# Patient Record
Sex: MALE | URBAN - METROPOLITAN AREA
[De-identification: names, ages, dates, MRNs, and addresses within clinical notes are randomized per-mention and may not be internally consistent; named-entity substitution may affect disease eponyms.]

---

## 2019-07-22 ENCOUNTER — APPOINTMENT (RX ONLY)
Dept: URBAN - METROPOLITAN AREA CLINIC 143 | Facility: CLINIC | Age: 74
Setting detail: DERMATOLOGY
End: 2019-07-22

## 2019-07-22 DIAGNOSIS — B35.1 TINEA UNGUIUM: ICD-10-CM

## 2019-07-22 DIAGNOSIS — L81.4 OTHER MELANIN HYPERPIGMENTATION: ICD-10-CM

## 2019-07-22 DIAGNOSIS — L82.1 OTHER SEBORRHEIC KERATOSIS: ICD-10-CM

## 2019-07-22 DIAGNOSIS — D18.0 HEMANGIOMA: ICD-10-CM

## 2019-07-22 DIAGNOSIS — L72.8 OTHER FOLLICULAR CYSTS OF THE SKIN AND SUBCUTANEOUS TISSUE: ICD-10-CM

## 2019-07-22 DIAGNOSIS — D22 MELANOCYTIC NEVI: ICD-10-CM

## 2019-07-22 DIAGNOSIS — N48.1 BALANITIS: ICD-10-CM

## 2019-07-22 PROBLEM — D18.01 HEMANGIOMA OF SKIN AND SUBCUTANEOUS TISSUE: Status: ACTIVE | Noted: 2019-07-22

## 2019-07-22 PROBLEM — D22.5 MELANOCYTIC NEVI OF TRUNK: Status: ACTIVE | Noted: 2019-07-22

## 2019-07-22 PROBLEM — E13.9 OTHER SPECIFIED DIABETES MELLITUS WITHOUT COMPLICATIONS: Status: ACTIVE | Noted: 2019-07-22

## 2019-07-22 PROCEDURE — ? NAIL CLIPPING FOR PAS

## 2019-07-22 PROCEDURE — ? COUNSELING

## 2019-07-22 PROCEDURE — ? PRESCRIPTION

## 2019-07-22 PROCEDURE — 99203 OFFICE O/P NEW LOW 30 MIN: CPT

## 2019-07-22 RX ORDER — FLUOCINONIDE 0.5 MG/G
CREAM TOPICAL
Qty: 1 | Refills: 2 | Status: ERX | COMMUNITY
Start: 2019-07-22

## 2019-07-22 RX ADMIN — FLUOCINONIDE: 0.5 CREAM TOPICAL at 23:16

## 2019-07-22 ASSESSMENT — LOCATION SIMPLE DESCRIPTION DERM
LOCATION SIMPLE: LEFT GREAT TOE
LOCATION SIMPLE: ABDOMEN
LOCATION SIMPLE: UPPER BACK
LOCATION SIMPLE: RIGHT GREAT TOE
LOCATION SIMPLE: LOWER BACK

## 2019-07-22 ASSESSMENT — LOCATION ZONE DERM
LOCATION ZONE: TRUNK
LOCATION ZONE: TOENAIL
LOCATION ZONE: TOE

## 2019-07-22 ASSESSMENT — LOCATION DETAILED DESCRIPTION DERM
LOCATION DETAILED: EPIGASTRIC SKIN
LOCATION DETAILED: LEFT DISTAL PLANTAR GREAT TOE
LOCATION DETAILED: INFERIOR THORACIC SPINE
LOCATION DETAILED: SUPERIOR LUMBAR SPINE
LOCATION DETAILED: RIGHT GREAT TOENAIL

## 2019-07-22 NOTE — PROCEDURE: MIPS QUALITY
Detail Level: Detailed
Quality 155 (Denominator): Falls Plan Of Care: Plan of Care not Documented, Reason not Otherwise Specified
Quality 131: Pain Assessment And Follow-Up: Pain assessment using a standardized tool is documented as negative, no follow-up plan required
Quality 154 Part B: Falls: Risk Screening (Should Be Reported With Measure 155.): Patient screened for future fall risk; documentation of no falls in the past year or only one fall without injury in the past year
Quality 154 Part A: Falls: Risk Assessment (Should Be Reported With Measure 155.): Falls risk assessment completed and documented in the past 12 months.
Quality 47: Advance Care Plan: Advance Care Planning discussed and documented in the medical record; patient did not wish or was not able to name a surrogate decision maker or provide an advance care plan.
Quality 110: Preventive Care And Screening: Influenza Immunization: Influenza Immunization not Administered for Documented Reasons.
Quality 111:Pneumonia Vaccination Status For Older Adults: Pneumococcal Vaccination not Administered or Previously Received, Reason not Otherwise Specified
Quality 226: Preventive Care And Screening: Tobacco Use: Screening And Cessation Intervention: Patient screened for tobacco use and is an ex/non-smoker

## 2019-08-16 ENCOUNTER — APPOINTMENT (RX ONLY)
Dept: URBAN - METROPOLITAN AREA CLINIC 142 | Facility: CLINIC | Age: 74
Setting detail: DERMATOLOGY
End: 2019-08-16

## 2019-08-16 DIAGNOSIS — L08.9 LOCAL INFECTION OF THE SKIN AND SUBCUTANEOUS TISSUE, UNSPECIFIED: ICD-10-CM

## 2019-08-16 DIAGNOSIS — L60.3 NAIL DYSTROPHY: ICD-10-CM

## 2019-08-16 DIAGNOSIS — N48.1 BALANITIS: ICD-10-CM

## 2019-08-16 PROCEDURE — 99213 OFFICE O/P EST LOW 20 MIN: CPT

## 2019-08-16 PROCEDURE — ? PRESCRIPTION

## 2019-08-16 PROCEDURE — ? COUNSELING

## 2019-08-16 PROCEDURE — ? ORDER TESTS

## 2019-08-16 RX ORDER — CIPROFLOXACIN HYDROCHLORIDE 500 MG/1
TABLET, FILM COATED ORAL TWICE A DAY
Qty: 20 | Refills: 0 | Status: ERX | COMMUNITY
Start: 2019-08-16

## 2019-08-16 RX ORDER — POLY-UREAURETHANE 16 %
NAIL FILM SOLUTION (ML) TOPICAL
Qty: 1 | Refills: 10 | Status: ERX | COMMUNITY
Start: 2019-08-16

## 2019-08-16 RX ORDER — DOXYCYCLINE HYCLATE 100 MG/1
CAPSULE, GELATIN COATED ORAL
Qty: 20 | Refills: 0 | Status: ERX | COMMUNITY
Start: 2019-08-16

## 2019-08-16 RX ADMIN — Medication: at 21:52

## 2019-08-16 RX ADMIN — CIPROFLOXACIN HYDROCHLORIDE: 500 TABLET, FILM COATED ORAL at 21:43

## 2019-08-16 RX ADMIN — DOXYCYCLINE HYCLATE: 100 CAPSULE, GELATIN COATED ORAL at 21:44

## 2019-08-16 ASSESSMENT — LOCATION ZONE DERM
LOCATION ZONE: PENIS
LOCATION ZONE: TOE
LOCATION ZONE: TOENAIL

## 2019-08-16 ASSESSMENT — LOCATION SIMPLE DESCRIPTION DERM
LOCATION SIMPLE: LEFT GREAT TOE
LOCATION SIMPLE: PENIS
LOCATION SIMPLE: RIGHT GREAT TOE

## 2019-08-16 ASSESSMENT — LOCATION DETAILED DESCRIPTION DERM
LOCATION DETAILED: LEFT DISTAL PLANTAR GREAT TOE
LOCATION DETAILED: RIGHT GREAT TOENAIL
LOCATION DETAILED: DORSAL GLANS

## 2019-08-16 NOTE — PROCEDURE: MIPS QUALITY
Quality 110: Preventive Care And Screening: Influenza Immunization: Influenza Immunization not Administered for Documented Reasons.
Detail Level: Detailed
Quality 226: Preventive Care And Screening: Tobacco Use: Screening And Cessation Intervention: Patient screened for tobacco use and is an ex/non-smoker
Quality 131: Pain Assessment And Follow-Up: Pain assessment using a standardized tool is documented as negative, no follow-up plan required
Quality 155 (Denominator): Falls Plan Of Care: Plan of Care not Documented, Reason not Otherwise Specified
Quality 47: Advance Care Plan: Advance Care Planning discussed and documented in the medical record; patient did not wish or was not able to name a surrogate decision maker or provide an advance care plan.
Quality 111:Pneumonia Vaccination Status For Older Adults: Pneumococcal Vaccination not Administered or Previously Received, Reason not Otherwise Specified
Quality 154 Part B: Falls: Risk Screening (Should Be Reported With Measure 155.): Patient screened for future fall risk; documentation of no falls in the past year or only one fall without injury in the past year
Quality 154 Part A: Falls: Risk Assessment (Should Be Reported With Measure 155.): Falls risk assessment completed and documented in the past 12 months.

## 2023-03-14 ENCOUNTER — APPOINTMENT (OUTPATIENT)
Dept: CT IMAGING | Age: 78
DRG: 056 | End: 2023-03-14
Payer: MEDICARE

## 2023-03-14 ENCOUNTER — HOSPITAL ENCOUNTER (INPATIENT)
Age: 78
LOS: 1 days | Discharge: HOME | DRG: 056 | End: 2023-03-16
Attending: EMERGENCY MEDICINE | Admitting: INTERNAL MEDICINE
Payer: MEDICARE

## 2023-03-14 DIAGNOSIS — R68.89 POSTURAL INSTABILITY OF TRUNK: ICD-10-CM

## 2023-03-14 DIAGNOSIS — R41.82 ALTERED MENTAL STATUS, UNSPECIFIED ALTERED MENTAL STATUS TYPE: ICD-10-CM

## 2023-03-14 DIAGNOSIS — R42 DIZZINESS: ICD-10-CM

## 2023-03-14 DIAGNOSIS — R29.90 STROKE-LIKE SYMPTOMS: Primary | ICD-10-CM

## 2023-03-14 LAB
ALBUMIN SERPL-MCNC: 4 G/DL (ref 3.4–5)
ALBUMIN/GLOB SERPL: 1.2 {RATIO} (ref 1.1–2.2)
ALP SERPL-CCNC: 72 U/L (ref 40–129)
ALT SERPL-CCNC: 16 U/L (ref 10–40)
ANION GAP SERPL CALCULATED.3IONS-SCNC: 11 MMOL/L (ref 3–16)
AST SERPL-CCNC: 28 U/L (ref 15–37)
BASOPHILS # BLD: 0 K/UL (ref 0–0.2)
BASOPHILS NFR BLD: 0.4 %
BILIRUB SERPL-MCNC: 0.5 MG/DL (ref 0–1)
BUN SERPL-MCNC: 14 MG/DL (ref 7–20)
CALCIUM SERPL-MCNC: 9.3 MG/DL (ref 8.3–10.6)
CHLORIDE SERPL-SCNC: 99 MMOL/L (ref 99–110)
CO2 SERPL-SCNC: 26 MMOL/L (ref 21–32)
CREAT SERPL-MCNC: 0.8 MG/DL (ref 0.8–1.3)
DEPRECATED RDW RBC AUTO: 14.6 % (ref 12.4–15.4)
EOSINOPHIL # BLD: 0 K/UL (ref 0–0.6)
EOSINOPHIL NFR BLD: 0.3 %
GFR SERPLBLD CREATININE-BSD FMLA CKD-EPI: >60 ML/MIN/{1.73_M2}
GLUCOSE SERPL-MCNC: 237 MG/DL (ref 70–99)
HCT VFR BLD AUTO: 41.2 % (ref 40.5–52.5)
HGB BLD-MCNC: 13.4 G/DL (ref 13.5–17.5)
INR PPP: 0.93 (ref 0.87–1.14)
LYMPHOCYTES # BLD: 0.5 K/UL (ref 1–5.1)
LYMPHOCYTES NFR BLD: 5.7 %
MCH RBC QN AUTO: 29.9 PG (ref 26–34)
MCHC RBC AUTO-ENTMCNC: 32.4 G/DL (ref 31–36)
MCV RBC AUTO: 92.2 FL (ref 80–100)
MONOCYTES # BLD: 0.7 K/UL (ref 0–1.3)
MONOCYTES NFR BLD: 8.9 %
NEUTROPHILS # BLD: 6.9 K/UL (ref 1.7–7.7)
NEUTROPHILS NFR BLD: 84.7 %
PLATELET # BLD AUTO: 227 K/UL (ref 135–450)
PMV BLD AUTO: 8.5 FL (ref 5–10.5)
POTASSIUM SERPL-SCNC: 4.7 MMOL/L (ref 3.5–5.1)
PROT SERPL-MCNC: 7.3 G/DL (ref 6.4–8.2)
PROTHROMBIN TIME: 12.3 SEC (ref 11.7–14.5)
RBC # BLD AUTO: 4.47 M/UL (ref 4.2–5.9)
SODIUM SERPL-SCNC: 136 MMOL/L (ref 136–145)
TROPONIN T SERPL-MCNC: <0.01 NG/ML
WBC # BLD AUTO: 8.1 K/UL (ref 4–11)

## 2023-03-14 PROCEDURE — 70450 CT HEAD/BRAIN W/O DYE: CPT

## 2023-03-14 PROCEDURE — 70498 CT ANGIOGRAPHY NECK: CPT

## 2023-03-14 PROCEDURE — 6360000004 HC RX CONTRAST MEDICATION: Performed by: PHYSICIAN ASSISTANT

## 2023-03-14 PROCEDURE — 99285 EMERGENCY DEPT VISIT HI MDM: CPT

## 2023-03-14 PROCEDURE — 85025 COMPLETE CBC W/AUTO DIFF WBC: CPT

## 2023-03-14 PROCEDURE — 84484 ASSAY OF TROPONIN QUANT: CPT

## 2023-03-14 PROCEDURE — 93005 ELECTROCARDIOGRAM TRACING: CPT | Performed by: PHYSICIAN ASSISTANT

## 2023-03-14 PROCEDURE — 80053 COMPREHEN METABOLIC PANEL: CPT

## 2023-03-14 PROCEDURE — 85610 PROTHROMBIN TIME: CPT

## 2023-03-14 RX ORDER — ASCORBIC ACID 500 MG
500 TABLET ORAL DAILY
COMMUNITY

## 2023-03-14 RX ORDER — DAPAGLIFLOZIN AND METFORMIN HYDROCHLORIDE 5; 1000 MG/1; MG/1
1 TABLET, FILM COATED, EXTENDED RELEASE ORAL DAILY
COMMUNITY

## 2023-03-14 RX ORDER — LOSARTAN POTASSIUM 50 MG/1
50 TABLET ORAL DAILY
COMMUNITY

## 2023-03-14 RX ORDER — AZITHROMYCIN 250 MG/1
TABLET, FILM COATED ORAL DAILY
Status: ON HOLD | COMMUNITY
End: 2023-03-16 | Stop reason: HOSPADM

## 2023-03-14 RX ADMIN — IOPAMIDOL 75 ML: 755 INJECTION, SOLUTION INTRAVENOUS at 22:08

## 2023-03-14 ASSESSMENT — PAIN - FUNCTIONAL ASSESSMENT: PAIN_FUNCTIONAL_ASSESSMENT: NONE - DENIES PAIN

## 2023-03-15 ENCOUNTER — APPOINTMENT (OUTPATIENT)
Dept: MRI IMAGING | Age: 78
DRG: 056 | End: 2023-03-15
Payer: MEDICARE

## 2023-03-15 PROBLEM — I63.9 ACUTE CVA (CEREBROVASCULAR ACCIDENT) (HCC): Status: RESOLVED | Noted: 2023-03-15 | Resolved: 2023-03-15

## 2023-03-15 PROBLEM — I63.9 ACUTE CVA (CEREBROVASCULAR ACCIDENT) (HCC): Status: ACTIVE | Noted: 2023-03-15

## 2023-03-15 PROBLEM — R27.0 ATAXIA: Status: ACTIVE | Noted: 2023-03-15

## 2023-03-15 LAB
BASOPHILS # BLD: 0 K/UL (ref 0–0.2)
BASOPHILS NFR BLD: 0.4 %
CHOLEST SERPL-MCNC: 111 MG/DL (ref 0–199)
DEPRECATED RDW RBC AUTO: 14.6 % (ref 12.4–15.4)
EKG ATRIAL RATE: 90 BPM
EKG DIAGNOSIS: NORMAL
EKG P AXIS: 64 DEGREES
EKG P-R INTERVAL: 172 MS
EKG Q-T INTERVAL: 350 MS
EKG QRS DURATION: 102 MS
EKG QTC CALCULATION (BAZETT): 428 MS
EKG R AXIS: -50 DEGREES
EKG T AXIS: 11 DEGREES
EKG VENTRICULAR RATE: 90 BPM
EOSINOPHIL # BLD: 0 K/UL (ref 0–0.6)
EOSINOPHIL NFR BLD: 0 %
EST. AVERAGE GLUCOSE BLD GHB EST-MCNC: 234.6 MG/DL
GLUCOSE BLD-MCNC: 147 MG/DL (ref 70–99)
GLUCOSE BLD-MCNC: 166 MG/DL (ref 70–99)
GLUCOSE BLD-MCNC: 194 MG/DL (ref 70–99)
GLUCOSE BLD-MCNC: 216 MG/DL (ref 70–99)
GLUCOSE BLD-MCNC: 260 MG/DL (ref 70–99)
HBA1C MFR BLD: 9.8 %
HCT VFR BLD AUTO: 37.8 % (ref 40.5–52.5)
HDLC SERPL-MCNC: 50 MG/DL (ref 40–60)
HGB BLD-MCNC: 12.3 G/DL (ref 13.5–17.5)
LDLC SERPL CALC-MCNC: 50 MG/DL
LV EF: 63 %
LVEF MODALITY: NORMAL
LYMPHOCYTES # BLD: 0.7 K/UL (ref 1–5.1)
LYMPHOCYTES NFR BLD: 10.2 %
MCH RBC QN AUTO: 29.9 PG (ref 26–34)
MCHC RBC AUTO-ENTMCNC: 32.7 G/DL (ref 31–36)
MCV RBC AUTO: 91.5 FL (ref 80–100)
MONOCYTES # BLD: 0.7 K/UL (ref 0–1.3)
MONOCYTES NFR BLD: 10.4 %
NEUTROPHILS # BLD: 5.1 K/UL (ref 1.7–7.7)
NEUTROPHILS NFR BLD: 79 %
PERFORMED ON: ABNORMAL
PLATELET # BLD AUTO: 196 K/UL (ref 135–450)
PMV BLD AUTO: 8.8 FL (ref 5–10.5)
RBC # BLD AUTO: 4.13 M/UL (ref 4.2–5.9)
TRIGL SERPL-MCNC: 53 MG/DL (ref 0–150)
TROPONIN T SERPL-MCNC: <0.01 NG/ML
VLDLC SERPL CALC-MCNC: 11 MG/DL
WBC # BLD AUTO: 6.4 K/UL (ref 4–11)

## 2023-03-15 PROCEDURE — 6370000000 HC RX 637 (ALT 250 FOR IP): Performed by: INTERNAL MEDICINE

## 2023-03-15 PROCEDURE — 6370000000 HC RX 637 (ALT 250 FOR IP): Performed by: PHYSICIAN ASSISTANT

## 2023-03-15 PROCEDURE — 36415 COLL VENOUS BLD VENIPUNCTURE: CPT

## 2023-03-15 PROCEDURE — 97161 PT EVAL LOW COMPLEX 20 MIN: CPT | Performed by: PHYSICAL THERAPIST

## 2023-03-15 PROCEDURE — 85025 COMPLETE CBC W/AUTO DIFF WBC: CPT

## 2023-03-15 PROCEDURE — 2060000000 HC ICU INTERMEDIATE R&B

## 2023-03-15 PROCEDURE — 80061 LIPID PANEL: CPT

## 2023-03-15 PROCEDURE — 93306 TTE W/DOPPLER COMPLETE: CPT

## 2023-03-15 PROCEDURE — 1200000000 HC SEMI PRIVATE

## 2023-03-15 PROCEDURE — 84484 ASSAY OF TROPONIN QUANT: CPT

## 2023-03-15 PROCEDURE — 97165 OT EVAL LOW COMPLEX 30 MIN: CPT

## 2023-03-15 PROCEDURE — 97116 GAIT TRAINING THERAPY: CPT | Performed by: PHYSICAL THERAPIST

## 2023-03-15 PROCEDURE — 70551 MRI BRAIN STEM W/O DYE: CPT

## 2023-03-15 PROCEDURE — 97530 THERAPEUTIC ACTIVITIES: CPT | Performed by: PHYSICAL THERAPIST

## 2023-03-15 PROCEDURE — 97530 THERAPEUTIC ACTIVITIES: CPT

## 2023-03-15 PROCEDURE — 83036 HEMOGLOBIN GLYCOSYLATED A1C: CPT

## 2023-03-15 PROCEDURE — 2580000003 HC RX 258: Performed by: INTERNAL MEDICINE

## 2023-03-15 RX ORDER — SODIUM CHLORIDE 0.9 % (FLUSH) 0.9 %
10 SYRINGE (ML) INJECTION EVERY 12 HOURS SCHEDULED
Status: DISCONTINUED | OUTPATIENT
Start: 2023-03-15 | End: 2023-03-16 | Stop reason: HOSPADM

## 2023-03-15 RX ORDER — ERYTHROMYCIN 5 MG/G
OINTMENT OPHTHALMIC EVERY 8 HOURS SCHEDULED
Status: DISCONTINUED | OUTPATIENT
Start: 2023-03-15 | End: 2023-03-16 | Stop reason: HOSPADM

## 2023-03-15 RX ORDER — INSULIN LISPRO 100 [IU]/ML
0-4 INJECTION, SOLUTION INTRAVENOUS; SUBCUTANEOUS NIGHTLY
Status: DISCONTINUED | OUTPATIENT
Start: 2023-03-15 | End: 2023-03-16 | Stop reason: HOSPADM

## 2023-03-15 RX ORDER — ASCORBIC ACID 500 MG
500 TABLET ORAL DAILY
Status: DISCONTINUED | OUTPATIENT
Start: 2023-03-15 | End: 2023-03-16 | Stop reason: HOSPADM

## 2023-03-15 RX ORDER — POTASSIUM CHLORIDE 7.45 MG/ML
10 INJECTION INTRAVENOUS PRN
Status: DISCONTINUED | OUTPATIENT
Start: 2023-03-15 | End: 2023-03-16 | Stop reason: HOSPADM

## 2023-03-15 RX ORDER — LOSARTAN POTASSIUM 50 MG/1
50 TABLET ORAL DAILY
Status: DISCONTINUED | OUTPATIENT
Start: 2023-03-15 | End: 2023-03-16 | Stop reason: HOSPADM

## 2023-03-15 RX ORDER — ONDANSETRON 2 MG/ML
4 INJECTION INTRAMUSCULAR; INTRAVENOUS EVERY 6 HOURS PRN
Status: DISCONTINUED | OUTPATIENT
Start: 2023-03-15 | End: 2023-03-16 | Stop reason: HOSPADM

## 2023-03-15 RX ORDER — PROMETHAZINE HYDROCHLORIDE 25 MG/1
12.5 TABLET ORAL EVERY 6 HOURS PRN
Status: DISCONTINUED | OUTPATIENT
Start: 2023-03-15 | End: 2023-03-16 | Stop reason: HOSPADM

## 2023-03-15 RX ORDER — ASPIRIN 300 MG/1
300 SUPPOSITORY RECTAL DAILY
Status: DISCONTINUED | OUTPATIENT
Start: 2023-03-15 | End: 2023-03-16 | Stop reason: HOSPADM

## 2023-03-15 RX ORDER — MAGNESIUM SULFATE IN WATER 40 MG/ML
2000 INJECTION, SOLUTION INTRAVENOUS PRN
Status: DISCONTINUED | OUTPATIENT
Start: 2023-03-15 | End: 2023-03-16 | Stop reason: HOSPADM

## 2023-03-15 RX ORDER — POLYETHYLENE GLYCOL 3350 17 G/17G
17 POWDER, FOR SOLUTION ORAL DAILY PRN
Status: DISCONTINUED | OUTPATIENT
Start: 2023-03-15 | End: 2023-03-16 | Stop reason: HOSPADM

## 2023-03-15 RX ORDER — DEXTROSE MONOHYDRATE 100 MG/ML
INJECTION, SOLUTION INTRAVENOUS CONTINUOUS PRN
Status: DISCONTINUED | OUTPATIENT
Start: 2023-03-15 | End: 2023-03-16 | Stop reason: HOSPADM

## 2023-03-15 RX ORDER — INSULIN LISPRO 100 [IU]/ML
0-4 INJECTION, SOLUTION INTRAVENOUS; SUBCUTANEOUS
Status: DISCONTINUED | OUTPATIENT
Start: 2023-03-15 | End: 2023-03-16 | Stop reason: HOSPADM

## 2023-03-15 RX ORDER — ATORVASTATIN CALCIUM 80 MG/1
80 TABLET, FILM COATED ORAL NIGHTLY
Status: DISCONTINUED | OUTPATIENT
Start: 2023-03-15 | End: 2023-03-16 | Stop reason: HOSPADM

## 2023-03-15 RX ORDER — LABETALOL HYDROCHLORIDE 5 MG/ML
10 INJECTION, SOLUTION INTRAVENOUS EVERY 10 MIN PRN
Status: DISCONTINUED | OUTPATIENT
Start: 2023-03-15 | End: 2023-03-16 | Stop reason: HOSPADM

## 2023-03-15 RX ORDER — ASPIRIN 81 MG/1
81 TABLET ORAL DAILY
Status: DISCONTINUED | OUTPATIENT
Start: 2023-03-15 | End: 2023-03-16 | Stop reason: HOSPADM

## 2023-03-15 RX ORDER — SODIUM CHLORIDE 0.9 % (FLUSH) 0.9 %
10 SYRINGE (ML) INJECTION PRN
Status: DISCONTINUED | OUTPATIENT
Start: 2023-03-15 | End: 2023-03-16 | Stop reason: HOSPADM

## 2023-03-15 RX ORDER — SODIUM CHLORIDE 9 MG/ML
INJECTION, SOLUTION INTRAVENOUS PRN
Status: DISCONTINUED | OUTPATIENT
Start: 2023-03-15 | End: 2023-03-16 | Stop reason: HOSPADM

## 2023-03-15 RX ORDER — ACETAMINOPHEN 650 MG/1
650 SUPPOSITORY RECTAL EVERY 6 HOURS PRN
Status: DISCONTINUED | OUTPATIENT
Start: 2023-03-15 | End: 2023-03-16 | Stop reason: HOSPADM

## 2023-03-15 RX ORDER — ACETAMINOPHEN 325 MG/1
650 TABLET ORAL EVERY 6 HOURS PRN
Status: DISCONTINUED | OUTPATIENT
Start: 2023-03-15 | End: 2023-03-16 | Stop reason: HOSPADM

## 2023-03-15 RX ADMIN — ATORVASTATIN CALCIUM 80 MG: 80 TABLET, FILM COATED ORAL at 03:24

## 2023-03-15 RX ADMIN — OXYCODONE HYDROCHLORIDE AND ACETAMINOPHEN 500 MG: 500 TABLET ORAL at 12:21

## 2023-03-15 RX ADMIN — ERYTHROMYCIN: 5 OINTMENT OPHTHALMIC at 12:22

## 2023-03-15 RX ADMIN — ASPIRIN 81 MG: 81 TABLET, COATED ORAL at 12:21

## 2023-03-15 RX ADMIN — ATORVASTATIN CALCIUM 80 MG: 80 TABLET, FILM COATED ORAL at 21:07

## 2023-03-15 RX ADMIN — ACETAMINOPHEN 650 MG: 325 TABLET ORAL at 03:24

## 2023-03-15 RX ADMIN — SODIUM CHLORIDE, PRESERVATIVE FREE 10 ML: 5 INJECTION INTRAVENOUS at 12:23

## 2023-03-15 RX ADMIN — ACETAMINOPHEN 650 MG: 325 TABLET ORAL at 21:06

## 2023-03-15 RX ADMIN — ERYTHROMYCIN: 5 OINTMENT OPHTHALMIC at 21:07

## 2023-03-15 RX ADMIN — ASPIRIN 325 MG: 325 TABLET, COATED ORAL at 00:47

## 2023-03-15 RX ADMIN — SODIUM CHLORIDE, PRESERVATIVE FREE 10 ML: 5 INJECTION INTRAVENOUS at 21:07

## 2023-03-15 ASSESSMENT — PAIN DESCRIPTION - ORIENTATION: ORIENTATION: MID

## 2023-03-15 ASSESSMENT — PAIN SCALES - GENERAL
PAINLEVEL_OUTOF10: 0
PAINLEVEL_OUTOF10: 8
PAINLEVEL_OUTOF10: 0
PAINLEVEL_OUTOF10: 0

## 2023-03-15 ASSESSMENT — PAIN DESCRIPTION - LOCATION
LOCATION: GENERALIZED
LOCATION: THROAT

## 2023-03-15 ASSESSMENT — PAIN DESCRIPTION - DESCRIPTORS
DESCRIPTORS: ACHING
DESCRIPTORS: ACHING

## 2023-03-15 NOTE — PROGRESS NOTES
Pharmacy Medication Reconciliation Note     List of medications Ismel Davey Aschoff is currently taking is complete. Source of information:   1. Conversation with family at bedside  2. EMR    Notes regarding home medications:   1. Daughter showed list and reports she believes all AM meds taken PTA in the ED  2. Reports patient brought azithromycin from [place] and took some this AM due to cough. Unable to verify how much was taken.     Patient denies taking any OTC or herbal medications    Martha Pierre, Pharmacy Intern  3/14/2023  9:54 PM

## 2023-03-15 NOTE — PROGRESS NOTES
Pt arrived to floor via stretcher from ED and ambulated to bed. Telemetry activated. Patient oriented to room and use of call light. Call light and personal items within reach. Admission and assessment initiated. POC and education initiated and reviewed with patient &family. Swallow eval done and meds given.

## 2023-03-15 NOTE — PROGRESS NOTES
Occupational Therapy  Facility/Department: [facility] 5 PROGRESSIVE CARE  Occupational Therapy Initial Assessment  Should patient be discharged prior to another treatment session, this note shall serve as the discharge summary. Name: Katty Salvador  : 1945  MRN: [id number]  Date of Service: 3/15/2023    Discharge Recommendations:  Home with assist PRN  OT Equipment Recommendations  Equipment Needed: No       Patient Diagnosis(es): The primary encounter diagnosis was Stroke-like symptoms. Diagnoses of Altered mental status, unspecified altered mental status type, Postural instability of trunk, and Dizziness were also pertinent to this visit. Past Medical History:  has a past medical history of Diabetes mellitus ([facility] [address] and Hypertension. Past Surgical History:  has a past surgical history that includes eye surgery. Assessment   Performance deficits / Impairments: Decreased endurance;Decreased ADL status; Decreased balance;Decreased high-level IADLs  Assessment: Pt presents with nausea, vomitting and stroke-like symptoms, medical work up ongoing. Pt lives at home with wife in [facility], up visiting his daughter in [facility]. Pt was independent prior to admission. He required CGA wtih functional mobility without a device, SBA wtih RW. Recommend 1-2 visits of OT to ensure independence with self care and functional mobility while inpatient, no HHOT recommended due to family assist available. Prognosis: Good  Decision Making: Low Complexity  History: see above  Exam: mobility, self care  Assistance / Modification: RW  REQUIRES OT FOLLOW-UP: Yes  Activity Tolerance  Activity Tolerance: Patient Tolerated treatment well        Plan   Occupational Therapy Plan  Times Per Week: 1-2 visits only while inpatient  Times Per Day:  Once a day     Restrictions  Restrictions/Precautions  Restrictions/Precautions: Fall Risk  Position Activity Restriction  Other position/activity restrictions: peripheral neuropathy    Subjective   General  Chart Reviewed: Yes, Orders, Progress Notes, History and Physical  Patient assessed for rehabilitation services?: Yes  Additional Pertinent Hx: Per Dr. Manny Lam, \"This patient presents with symptoms as above. His last known well was 9 AM this morning. He presented with dizziness and difficulty ambulating. His NIHSS was 0. He had truncal instability. His symptoms were less than 24 hours in duration. A code stroke was activated. Stroke protocol orders were placed. Patient is in a normal sinus rhythm. His CT head shows no acute intracranial abnormalities. His CT angiogram head and neck shows no large vessel occlusion. Patient is out of the window for thrombolytic therapy. He has no LVO, no acute intervention indicated at this time. The patient will be admitted for observation, serial neurologic exam, and further treatment as indicated. Test results, diagnosis, and treatment plan were discussed with the patient. He understands treatment plan and need for admission is agreeable. \"  Family / Caregiver Present: Yes (Wife)  Referring Practitioner: Dr. Glo Bello  Diagnosis: R/O Stroke  Subjective  Subjective: Seen in room, agreed to OT/PT, no complaints.   Wife believes he has improved from yesterday     Social/Functional History  Social/Functional History  Lives With: Spouse  Type of Home: House  Home Layout: One level  Home Access: Level entry  Bathroom Shower/Tub: Walk-in shower  Bathroom Toilet: Handicap height  Bathroom Equipment: Grab bars in shower, Shower chair  Bathroom Accessibility: Walker accessible  Home Equipment: Melvia Crofts, rolling, Cane  Has the patient had two or more falls in the past year or any fall with injury in the past year?: Yes (last year he fell, has neuropathy)  Receives Help From: Family  ADL Assistance: Independent  Homemaking Assistance: Needs assistance  Ambulation Assistance: Needs assistance (RW primarily, not all the time)  Transfer Assistance: Independent  Active : Yes (has trouble with vision and neuropathy in feet)  Patient's  Info: only short distance  Mode of Transportation: [address]: used to read but not able to currently with decreased vision  Additional Comments: pt and wife live in [place]       Objective   Heart Rate: 73  Heart Rate Source: Monitor  BP: (!) 154/76  BP Location: Right upper arm  BP Method: Automatic  Patient Position: Up in chair  MAP (Calculated): 102  Resp: 18  SpO2: 98 %  O2 Device: None (Room air)             Safety Devices  Type of Devices:  (sent with transport to MRI)        AROM: Within functional limits  PROM: Within functional limits  Strength: Within functional limits  Coordination: Within functional limits  Tone: Normal  Sensation: Intact  ADL  Additional Comments: Reports from RN that he was brushing teeth with wife assist in bathroom this AM.  ADLs not observed as transport here to  for MRI     Activity Tolerance  Activity Tolerance: Patient tolerated treatment well  Bed mobility  Supine to Sit: Modified independent  Sit to Supine: Modified independent  Transfers  Sit to stand: Contact guard assistance  Stand to sit: Contact guard assistance  Transfer Comments: CGA with ambulation without device, SBA wtih RW.  Pt's RW is in [facility], wife said she can provide assist without walker at home until they can provide RW  Vision  Vision: Impaired  Vision Exceptions: Wears glasses for reading  Hearing  Hearing: Within functional limits (for eval)  Cognition  Overall Cognitive Status: WFL  Orientation  Overall Orientation Status: Within Functional Limits                  Education Given To: Patient; Family  Education Provided: Role of Therapy;Plan of Care  Education Method: Demonstration;Verbal  Barriers to Learning: None  Education Outcome: Verbalized understanding;Demonstrated understanding  LUE AROM (degrees)  LUE AROM : WFL  RUE AROM (degrees)  RUE AROM : [facility] OutComes Score   Wesly Francisco scored a 21/24 on the [facility] ADL Inpatient form. At this time, no further OT is recommended upon discharge due to assist available at home. Recommend patient returns to prior setting with prior services.                                                    AM-PAC Score        AM-PAC Inpatient Daily Activity Raw Score: 21 (03/15/23 1237)  AM-PAC Inpatient ADL T-Scale Score : 44.27 (03/15/23 1237)  ADL Inpatient CMS 0-100% Score: 32.79 (03/15/23 1237)  ADL Inpatient CMS G-Code Modifier : CJ (03/15/23 1237)           Goals  Short Term Goals  Time Frame for Short Term Goals: 1-2 visits  Short Term Goal 1: Pt will be mod I with functional mobility  Short Term Goal 2: Pt will be mod I with functional transfers  Short Term Goal 3: Pt will be mod I with bathing and dressing  Short Term Goal 4: Pt will be mod I with toileting  Patient Goals   Patient goals : Pt wants to go home with family soon       Therapy Time   Individual Concurrent Group Co-treatment   Time In 0908         Time Out 0950         Minutes 42         Timed Code Treatment Minutes: [address], OT

## 2023-03-15 NOTE — ED PROVIDER NOTES
Attending Supervisory Note/Shared Visit   I have personally performed a face to face diagnostic evaluation on this patient. I have reviewed the mid-levels findings and agree. History and Exam by me shows an alert male in no acute distress. Presents with dizziness and feeling off balance when walking. Woke up at 8 AM this morning. Ate breakfast.  Went to bed at 9 AM.  Woke up at 2 PM.  Symptoms present upon waking. Last known well was 8 AM.  Patient does have a history of hypertension. General: Alert male in no acute distress. HEENT: Atraumatic. Pupils equal round reactive. Extraocular movements are intact. No facial asymmetry. Heart: Regular rate and rhythm. No murmurs or gallops noted. Lungs: Breath sounds equal bilaterally and clear. Neuro: Awake, alert, oriented. Symmetrical reactive pupils. Intact extraocular movements. No nystagmus no facial asymmetry. No drift to the upper or lower extremities. No limb ataxia. Intact sensation to touch. No dysarthria expressive aphasia. No visual field deficits. He does have truncal instability. NIHSS of 0. EKG: Normal sinus rhythm, rate of 90, left atrial enlargement. Left axis deviation. Minimal voltage criteria for LVH. Rhythm strip shows a sinus rhythm with a rate of 90, NE interval 172 ms,  ms with no other ectopy as interpreted by me. No old EKG available for comparison. Labs Reviewed   CBC WITH AUTO DIFFERENTIAL - Abnormal; Notable for the following components:       Result Value    Hemoglobin 13.4 (*)     Lymphocytes Absolute 0.5 (*)     All other components within normal limits   COMPREHENSIVE METABOLIC PANEL W/ REFLEX TO MG FOR LOW K - Abnormal; Notable for the following components:    Glucose 237 (*)     All other components within normal limits   PROTIME-INR   TROPONIN   POCT GLUCOSE     CTA HEAD NECK W CONTRAST   Final Result   1. No large vessel occlusion of the intracranial arteries.    2. Moderate focal stenosis of the A2 segment left anterior cerebral artery. 3. No significant arterial stenosis in the neck. CT HEAD WO CONTRAST   Final Result   Addendum (preliminary) 1 of 1   ADDENDUM:   Findings were conveyed to [address] at 10:14 pm on 3/14/2023. Final   1. No acute intracranial abnormality. 2. 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 The findings were sent to the Radiology Results [address] at 10:12   pm on 3/14/2023 to be communicated to a licensed caregiver. This patient presents with symptoms as above. His last known well was 9 AM this morning. He presented with dizziness and difficulty ambulating. His NIHSS was 0. He had truncal instability. His symptoms were less than 24 hours in duration. A code stroke was activated. Stroke protocol orders were placed. Patient is in a normal sinus rhythm. His CT head shows no acute intracranial abnormalities. His CT angiogram head and neck shows no large vessel occlusion. Patient is out of the window for thrombolytic therapy. He has no LVO, no acute intervention indicated at this time. The patient will be admitted for observation, serial neurologic exam, and further treatment as indicated. Test results, diagnosis, and treatment plan were discussed with the patient. He understands treatment plan and need for admission is agreeable. 1. Stroke-like symptoms    2. Altered mental status, unspecified altered mental status type    3. Postural instability of trunk    4.  Dizziness      Disposition:  Admit      (Please note that portions of this note were completed with a voice recognition program.  Efforts were made to edit the dictations but occasionally words are mis-transcribed.)    Saloni Sewell MD  Attending Emergency Physician        Snow Mendoza MD  03/15/23 7167

## 2023-03-15 NOTE — H&P
Hospital Medicine History and Physical    3/15/2023    Date of Admission: 3/15/2023    Date of Service: Pt seen/examined on 3/15/2023 and admitted to inpatient. Assessment/plan:  Ataxia, unclear etiology. Patient admitted for evaluation of possible CVA. Continue antiplatelet therapy, statin. CT-head was unremarkable for acute pathology. CT-head/neck with moderate focal stenosis of A2 segment of left anterior carotid artery. MRI-brain is negative. At baseline, he has visual deficits and wears glasses. MRI-brain reveals evidence of chronic infarct in left cerebellum and chronic lacunar infarcts involving both thalami and left josselin. He will continue aspirin and statin, lifelong. I wonder if presentation is a recrudescence from prior CVA, although patient and family unaware of prior CVA. Await further recommendations from neurology standpoint. Other comorbidities: history of uncontrolled diabetes type 2 with hyperglycemia, hypertension associated with diabetes, history of CVA (evidence of prior infection noted on CT-head). Activities: Up with assist  Prophylaxis: Subcutaneous Lovenox  Code status: Full code    ==========================================================  Chief complaint:  Chief Complaint   Patient presents with    Fatigue    Emesis     Pt is here from [place] [place] his daughter started this morning with congestion,sore throat and cough then about 1 hour ago started feeling nausea and had 1 episode of emesis       History of Presenting Illness: This is a pleasant 78 y.o. male with history of uncontrolled diabetes type 2 with hyperglycemia, hypertension associated with diabetes, history of CVA (evidence of prior infection noted on CT-head), who presents to the emergency room with complaints of dizziness, feeling of balance during ambulation, falls noted on waking up at 2 PM on 3/14/2023 (reportedly last known well around 8 AM on 3/14/2023).   He has had longstanding history of cough, not worse. He does have repeated coughing spells during meal intake. He does not have fever or chills. He has not had discomfort urinating; he has chronic urinary incontinence for at least a few years. In the emergency room, CT-head was unremarkable for acute pathology. CTA-head/neck revealed moderate focal stenosis of the A2 segment of left anterior carotid artery. Patient admitted for further evaluation for CVA. Past Medical History:      Diagnosis Date    Diabetes mellitus ([facility] [address]     Hypertension        Past Surgical History:      Procedure Laterality Date    EYE SURGERY         Medications (prior to admission):  Prior to Admission medications    Medication Sig Start Date End Date Taking? Authorizing Provider   Dapagliflozin-metFORMIN HCl ER (XIGDUO XR) 5-1000 MG TB24 Take 1 tablet by mouth daily   Yes Historical Provider, MD   losartan (COZAAR) 50 MG tablet Take 50 mg by mouth daily   Yes Historical Provider, MD   linagliptin (TRADJENTA) 5 MG tablet Take 5 mg by mouth daily   Yes Historical Provider, MD   azithromycin (ZITHROMAX) 250 MG tablet Take by mouth daily   Yes Historical Provider, MD   vitamin C (ASCORBIC ACID) 500 MG tablet Take 500 mg by mouth daily   Yes Historical Provider, MD       Allergy(ies): Shrimp (diagnostic)    Social History:  TOBACCO:  reports that he quit smoking about 43 years ago. His smoking use included cigarettes. He has never used smokeless tobacco.  ETOH:  reports that he does not currently use alcohol. Family History:      Family history unknown: Yes       Review of Systems:  Pertinent positives are listed in HPI. At least 10-point ROS reviewed and were negative. Vitals and physical examination:  BP (!) 153/74   Pulse 88   Temp 99.1 °F (37.3 °C) (Oral)   Resp 19   Ht 5' 9\" (1.753 m)   Wt 145 lb 8.1 oz (66 kg)   SpO2 96%   BMI 21.49 kg/m²   Gen/overall appearance: Not in acute distress. Alert.  Oriented X3  Head: Normocephalic, atraumatic  Eyes: EOMI, good acuity  ENT: Oral mucosa moist  Neck: No JVD, thyromegaly  CVS: Nml S1S2, no MRG, RRR  Pulm: Clear bilaterally. No crackles/wheezes  Gastrointestinal: Soft, NT/ND, +BS  Musculoskeletal: No edema. Warm  Neuro: No focal deficit. Moves extremity spontaneously. Psychiatry: Appropriate affect. Not agitated. Skin: Warm, dry with normal turgor. No rash  Capillary refill: Brisk,< 3 seconds   Peripheral Pulses: +2 palpable, equal bilaterally       Labs/imaging/EKG:  CBC:   Recent Labs     03/14/23  2145 03/15/23  0402   WBC 8.1 6.4   HGB 13.4* 12.3*    196     BMP:    Recent Labs     03/14/23 2145      K 4.7   CL 99   CO2 26   BUN 14   CREATININE 0.8   GLUCOSE 237*     Hepatic:   Recent Labs     03/14/23 2145   AST 28   ALT 16   BILITOT 0.5   ALKPHOS 72       CT HEAD WO CONTRAST    Addendum Date: 3/14/2023    ADDENDUM: Findings were conveyed to [address] at 10:14 pm on 3/14/2023. Result Date: 3/14/2023  EXAMINATION: CT OF THE HEAD WITHOUT CONTRAST  3/14/2023 9:42 pm TECHNIQUE: CT of the head was performed without the administration of intravenous contrast. Automated exposure control, iterative reconstruction, and/or weight based adjustment of the mA/kV was utilized to reduce the radiation dose to as low as reasonably achievable. COMPARISON: None. HISTORY: ORDERING SYSTEM PROVIDED HISTORY: Stroke TECHNOLOGIST PROVIDED HISTORY: Has a \"code stroke\" or \"stroke alert\" been called? ->Yes Reason for exam:->Stroke Decision Support Exception - unselect if not a suspected or confirmed emergency medical condition->Emergency Medical Condition (MA) Reason for Exam: stroke FINDINGS: BRAIN/VENTRICLES: There is no acute intracranial hemorrhage, mass effect or midline shift. No abnormal extra-axial fluid collection. The gray-white differentiation is maintained without evidence of an acute infarct. There is no evidence of hydrocephalus.  Senescent parenchymal volume loss is present with mild chronic white matter microvascular ischemic changes. There is a chronic lacunar infarct in the left josselin and left cerebellar encephalomalacia in keeping with sequela of prior infarct. ORBITS: The visualized portion of the orbits demonstrate no acute abnormality. SINUSES: The visualized paranasal sinuses and mastoid air cells demonstrate no acute abnormality. SOFT TISSUES/SKULL:  No acute abnormality of the visualized skull or soft tissues. 1. No acute intracranial abnormality. 2. 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 The findings were sent to the Radiology Results [address] at 10:12 pm on 3/14/2023 to be communicated to a licensed caregiver. CTA HEAD NECK W CONTRAST    Result Date: 3/14/2023  EXAMINATION: CTA OF THE HEAD AND NECK WITH CONTRAST 3/14/2023 9:42 pm: TECHNIQUE: CTA of the head and neck was performed with the administration of intravenous contrast. Multiplanar reformatted images are provided for review. MIP images are provided for review. Stenosis of the internal carotid arteries measured using NASCET criteria. Automated exposure control, iterative reconstruction, and/or weight based adjustment of the mA/kV was utilized to reduce the radiation dose to as low as reasonably achievable. COMPARISON: None. HISTORY: ORDERING SYSTEM PROVIDED HISTORY: Stroke TECHNOLOGIST PROVIDED HISTORY: Has a \"code stroke\" or \"stroke alert\" been called? ->Yes Reason for exam:->Stroke Decision Support Exception - unselect if not a suspected or confirmed emergency medical condition->Emergency Medical Condition (MA) Reason for Exam: stroke FINDINGS: CTA NECK: AORTIC ARCH/ARCH VESSELS: No dissection or arterial injury. No significant stenosis of the brachiocephalic or subclavian arteries. CAROTID ARTERIES: No dissection, arterial injury, or hemodynamically significant stenosis by NASCET criteria. VERTEBRAL ARTERIES: No dissection, arterial injury, or significant stenosis.  SOFT TISSUES: The lung apices are clear other than a chronic calcified right upper lobe granuloma. No cervical or superior mediastinal lymphadenopathy. The larynx and pharynx are unremarkable. No acute abnormality of the salivary and thyroid glands. BONES: No acute osseous abnormality. CTA HEAD: ANTERIOR CIRCULATION: No significant stenosis of the internal carotid arteries. Moderate focal stenosis of the A2 segment left anterior cerebral artery. No significant stenosis of the right anterior or bilateral middle cerebral arteries. No aneurysm. POSTERIOR CIRCULATION: No significant stenosis of the vertebral, basilar, or posterior cerebral arteries. No aneurysm. OTHER: No dural venous sinus thrombosis on this non-dedicated study. BRAIN: No mass effect or midline shift. No extra-axial fluid collection. The gray-white differentiation is maintained. 1. No large vessel occlusion of the intracranial arteries. 2. Moderate focal stenosis of the A2 segment left anterior cerebral artery. 3. No significant arterial stenosis in the neck. EKG: Normal sinus rhythm, rate 90 beats per minutes. No acute ST/T changes. QTc 428. I reviewed EKG. Thank you No primary care provider on file.  for the opportunity to be involved in this patient's care.    -----------------------------  Tex Ennis MD  [facility] hospitalist

## 2023-03-15 NOTE — PROGRESS NOTES
Speech Language Pathology    8:36 AM:  SLP eval/tx order received per stroke r/o protocol. Patient and wife met at bedside. SLP role/evaluation objectives discussed; patient's wife denies motor speech, receptive/expressive/cognitive language, and/or swallowing changes at this time and requests to hold SLP pending continued medical work-up findings. ST to re-attempt as schedule permits as medical work-up progresses. 1:31 PM  Followed up with patient and wife who report current work-up negative for CVA at this time. Will discontinue evaluation attempts per patient/family request.    Please re-consult ST should status change and/or if medical team deems evaluation necessary despite patient's verbalized preferences at this time. Thank you. Brenda Hernandez, #[id number]  Speech-Language Pathologist  Portable phone: [phone]      Thank you. Brenda Hernandez, #[id number]  Speech-Language Pathologist  Portable phone: [phone]

## 2023-03-15 NOTE — PLAN OF CARE
Problem: Discharge Planning  Goal: Discharge to home or other facility with appropriate resources  Outcome: Progressing  Flowsheets (Taken 3/15/2023 0433)  Discharge to home or other facility with appropriate resources:   Identify barriers to discharge with patient and caregiver   Arrange for needed discharge resources and transportation as appropriate     Problem: Skin/Tissue Integrity  Goal: Absence of new skin breakdown  Description: 1. Monitor for areas of redness and/or skin breakdown  2. Assess vascular access sites hourly  3. Every 4-6 hours minimum:  Change oxygen saturation probe site  4. Every 4-6 hours:  If on nasal continuous positive airway pressure, respiratory therapy assess nares and determine need for appliance change or resting period.   Outcome: Progressing     Problem: Safety - Adult  Goal: Free from fall injury  Outcome: Progressing  Flowsheets (Taken 3/15/2023 0433)  Free From Fall Injury: Instruct family/caregiver on patient safety     Problem: ABCDS Injury Assessment  Goal: Absence of physical injury  Outcome: Progressing  Flowsheets (Taken 3/15/2023 0433)  Absence of Physical Injury: Implement safety measures based on patient assessment     Problem: Neurosensory - Adult  Goal: Achieves stable or improved neurological status  Outcome: Progressing  Flowsheets (Taken 3/15/2023 0433)  Achieves stable or improved neurological status:   Assess for and report changes in neurological status   Initiate measures to prevent increased intracranial pressure

## 2023-03-15 NOTE — PROGRESS NOTES
Patient c/o left eye redness, watery, and pain behind eye. History of retina detachment with cataract lens replacement.  Updated Dr Koroma, ordered eye gtts, see orderrs

## 2023-03-15 NOTE — PLAN OF CARE
Patient is planned to complete ordered testing including MRI and urinalysis. Problem: Discharge Planning  Goal: Discharge to home or other facility with appropriate resources  3/15/2023 1448 by Paulo Garza RN  Outcome: Progressing  Flowsheets (Taken 3/15/2023 0830)  Discharge to home or other facility with appropriate resources: Identify barriers to discharge with patient and caregiver  Note: Patient was traveling from [facility] to [place] to visit his daughter and began to feel ill. Discharge plans include returning home to [facility] with spouse. 3/15/2023 0433 by Gwendolyn Hagen RN  Outcome: Progressing  Flowsheets (Taken 3/15/2023 1668)  Discharge to home or other facility with appropriate resources:   Identify barriers to discharge with patient and caregiver   Arrange for needed discharge resources and transportation as appropriate     Problem: Skin/Tissue Integrity  Goal: Absence of new skin breakdown  Description: 1. Monitor for areas of redness and/or skin breakdown  2. Assess vascular access sites hourly  3. Every 4-6 hours minimum:  Change oxygen saturation probe site  4. Every 4-6 hours:  If on nasal continuous positive airway pressure, respiratory therapy assess nares and determine need for appliance change or resting period. 3/15/2023 1448 by Paulo Garza RN  Outcome: Progressing  Note: In regards skin integrity, patient has scattered bruising due to lab draws and IV insertion. Patient turns self in bed and is able to move about in chair on own. Ambulates with assistance to and from bathroom, can be incontinent but is mostly continent, wears pull-up briefs.   3/15/2023 0433 by Gwendolyn Hagen RN  Outcome: Progressing     Problem: Safety - Adult  Goal: Free from fall injury  3/15/2023 1448 by Paulo Garza RN  Outcome: Carleen Fus (Taken 3/15/2023 0433 by Gwendolyn Hagen RN)  Free From Fall Injury: Instruct family/caregiver on patient safety  Note: Patient has his wife at bedside, uses call light appropriately, as well has chair/bed alarm on.   3/15/2023 0433 by Jeniffer Carvalho RN  Outcome: Progressing  Flowsheets (Taken 3/15/2023 0433)  Free From Fall Injury: Instruct family/caregiver on patient safety     Problem: ABCDS Injury Assessment  Goal: Absence of physical injury  3/15/2023 1448 by Yomaira Omalley RN  Outcome: Progressing  3/15/2023 0433 by Jeniffer Carvalho RN  Outcome: Progressing  Flowsheets (Taken 3/15/2023 0433)  Absence of Physical Injury: Implement safety measures based on patient assessment     Problem: Neurosensory - Adult  Goal: Achieves stable or improved neurological status  3/15/2023 1448 by Yomaira Omalley RN  Outcome: Progressing  Flowsheets (Taken 3/15/2023 0830)  Achieves stable or improved neurological status: Assess for and report changes in neurological status  3/15/2023 0433 by Jeniffer Carvalho RN  Outcome: Progressing  Flowsheets (Taken 3/15/2023 0433)  Achieves stable or improved neurological status:   Assess for and report changes in neurological status   Initiate measures to prevent increased intracranial pressure  Goal: Achieves maximal functionality and self care  3/15/2023 1448 by Yomaira Omalley RN  Outcome: Progressing  Flowsheets (Taken 3/15/2023 0830)  Achieves maximal functionality and self care: Monitor swallowing and airway patency with patient fatigue and changes in neurological status  3/15/2023 0433 by Jeniffer Carvalho RN  Outcome: Progressing  Flowsheets (Taken 3/15/2023 0433)  Achieves maximal functionality and self care: Monitor swallowing and airway patency with patient fatigue and changes in neurological status   Care plan reviewed with patient. Patient verbalizes understanding of the plan of care and contributed to goal setting.

## 2023-03-15 NOTE — CONSULTS
Neurology Consult Note  Reason for Consult: suspected CVA    Chief complaint: no complaints    Dr Deanna Naidu MD asked me to see Jose Chavira in consultation for evaluation of suspected CVA    History of Present Illness:  Jose Chavira is a 78 y.o. male who presents with altered mental status. I obtained my information via interview w/ the patient and his family at the bedside who was helpful, supplemented by chart review. They had just gotten back from [place] sometime on Sunday. They say that he started to exhibit some symptoms that they though may be a viral illness or common cold. He seemed to be OK. He apparently had eaten and took some sort of allergy medication and went back to sleep. When he woke up he was quite disoriented. They kept saying he was \"dizzy\". He needed help to the bathroom from his wife but he ended up just urinating on himself. She got him back to bed and while changing his soiled undergarments he just sort of fell back. He was very much out of it. He wasn't making sense. No LOC. No convulsions. No tongue biting. At one point he vomited. The patient claims to have recalled most if not all of this. They decided to bring him to the ED last night in order to be evaluated. BP was 174/76. CT head no acute abnormalities. CTA head/neck w/ L RUIZ stenosis though no LVO. No acute intervention was pursued. NIH per ED was 2 essentially for confusion. They say that later on in the evening, about 5 hours after symptom onset, he essentially returned to his baseline which he remains at today . She says that he had another brief episode of disorientation in the past though not much detail. No new medications. No changes in medications. No reported fevers. No known hx of stroke. He is supposed to be taking 81 mg asa daily though has not been because he does not like taking pills.       Medical History:  Past Medical History:   Diagnosis Date    Diabetes mellitus ([facility] [address]     Hypertension      Past Surgical History:   Procedure Laterality Date    EYE SURGERY       Scheduled Meds:   sodium chloride flush  10 mL IntraVENous 2 times per day    aspirin  81 mg Oral Daily    Or    aspirin  300 mg Rectal Daily    atorvastatin  80 mg Oral Nightly    insulin lispro  0-4 Units SubCUTAneous TID WC    insulin lispro  0-4 Units SubCUTAneous Nightly    [Held by provider] losartan  50 mg Oral Daily    vitamin C  500 mg Oral Daily    erythromycin   Left Eye 3 times per day     Medications Prior to Admission:   Dapagliflozin-metFORMIN HCl ER (XIGDUO XR) 5-1000 MG TB24, Take 1 tablet by mouth daily  losartan (COZAAR) 50 MG tablet, Take 50 mg by mouth daily  linagliptin (TRADJENTA) 5 MG tablet, Take 5 mg by mouth daily  azithromycin (ZITHROMAX) 250 MG tablet, Take by mouth daily  vitamin C (ASCORBIC ACID) 500 MG tablet, Take 500 mg by mouth daily    Allergies   Allergen Reactions    Shrimp (Diagnostic)      Family History   Family history unknown: Yes     Social History     Tobacco Use   Smoking Status Former    Types: Cigarettes    Quit date:     Years since quittin.2   Smokeless Tobacco Never     Social History     Substance and Sexual Activity   Drug Use Never     Social History     Substance and Sexual Activity   Alcohol Use Not Currently    Comment: occ     ROS  Constitutional- No weight loss or fevers  Eyes- No diplopia. No photophobia. Ears/nose/throat- No dysphagia. No Dysarthria  Cardiovascular- No palpitations. No chest pain  Respiratory- No dyspnea. No Cough  Gastrointestinal- No Abdominal pain. No Vomiting. Genitourinary- No incontinence. No urinary retention  Musculoskeletal- No myalgia. No arthralgia  Skin- No rash. No easy bruising. Psychiatric- No depression. No anxiety  Endocrine- No diabetes. No thyroid issues. Hematologic- No bleeding difficulty. No fatigue  Neurologic- No weakness. No Headache.     Exam  Blood pressure (!) 153/74, pulse 88, temperature 99.1 °F (37.3 °C), temperature source Oral, resp. rate 19, height 5' 9\" (1.753 m), weight 145 lb 8.1 oz (66 kg), SpO2 96 %. Constitutional    Vital signs: BP, HR, and RR reviewed   General alert, no distress  Eyes: unable to visualize the fundi  Cardiovascular: no peripheral edema. Psychiatric: cooperative with examination, no psychotic behavior noted. Neurologic  Mental status:   orientation to person, place. General fund of knowledge grossly intact   Memory grossly intact   Attention intact as able to attend well to the exam     Language fluent in conversation   Comprehension intact; follows simple commands  Cranial nerves:   CN2: visual fields full   CN 3,4,6: extraocular muscles intact  CN5: facial sensation symmetric   CN7: face symmetric without dysarthria  CN8: hearing grossly intact  CN11: trap full strength on shoulder shrug  CN12: tongue midline with protrusion  Strength: good strength in all 4 extremities   Sensory: chronic peripheral neuropathy. Cerebellar/coordination: finger nose finger normal without ataxia  Tone: normal in all 4 extremities  Gait: deferred at this time for safety. Labs  Glucose 237  Na 136  K 4.7  BUN 14  Cr 0.8    LDL 50  HgA1c 9.8    ALT 16  AST 28    WBC 6.4K  Hg 12.3  Platelets 243    Studies  MRI brain w/o 3/15/23, independently reviewed  Impression   1. No acute intracranial abnormality. No acute infarct. 2. There is a chronic infarct involving the left cerebellum with chronic   lacunar infarcts involving both thalami and left josselin. 3. Mild global parenchymal volume loss with mild-to-moderate chronic   microvascular ischemic changes. CTA head/neck 3/14/23, independently reviewed  Impression   1. No large vessel occlusion of the intracranial arteries. 2. Moderate focal stenosis of the A2 segment left anterior cerebral artery. 3. No significant arterial stenosis in the neck.      TTE 3/15/23   Left ventricular cavity size is normal.   There is mildly increased left ventricular wall thickness. Overall left ventricular systolic function appears normal.   Ejection fraction is visually estimated to be 60-65%. Grade I diastolic dysfunction with normal LV filling pressures. Mitral valve leaflets appear mildly thickened. Mild calcification of the posterior leaflet of the mitral valve. Trivial mitral regurgitation. The right ventricle is normal in size and function. Normal L atrium. Impression/Recommendations  Prolonged episode of dizziness/disorientation. Chronic brain infarcts. Hypertension. Uncontrolled DM. Peripheral neuropathy. The patient is currently at his baseline. His brain MRI did not show any acute findings, though chronic infarcts are noted which were unbeknown to him. The etiology of yesterday's event is not entirely clear. Differential could include TIA, metabolic/toxic causes, viral infection, medication effect, etc.     Given his hx of stoke, he should be taking 81 mg asa daily. Low dose statin would be reasonable (LDL well controlled). Additional stroke risk factor modification including BP control (< 140/90) and better glycemic control (HgA1c < 7.0). Consider metabolic workup.       Kiarra Gonzalez NP  [address] [place] [address] [id number] Neurology    A copy of this note was provided for Dr Brandon Pitts MD

## 2023-03-15 NOTE — ED PROVIDER NOTES
[address]        Pt Name: Julia Jackson  MRN: [id number]  [place] 1945  Date of evaluation: 3/14/2023  Provider: Alessandro Webb PA-C  PCP: No primary care provider on file. Note Started: 12:07 AM EDT 3/15/23       I have seen and evaluated this patient with my supervising physician Camden Nina MD.      CHIEF COMPLAINT       Chief Complaint   Patient presents with    Fatigue    Emesis     Pt is here from [address] visiting his daughter started this morning with congestion,sore throat and cough then about 1 hour ago started feeling nausea and had 1 episode of emesis       HISTORY OF PRESENT ILLNESS: 1 or more Elements     History From: Patient's wife who provides Estonian interpretation for us            Chief Complaint: Fatigue, emesis x1, dizziness, and confusion    Julia Jackson is a 78 y.o. male who presents reportedly was last known normal at 9 AM today. Patient reportedly speaks Estonian primarily. He does indicate that he would like his wife to translate for us. According to patient's wife he had gotten up at 8 AM and eaten breakfast.  He was thinking well. He was ambulating well. He went back to sleep at 9 AM which is uncommon for him. He did not wake up until 2:00. At that time and since he has had confusion, dizziness, unable to stand or walk on his own with truncal instability. He has been confused and had a hard time being oriented. He did vomit once today but has tolerated a little liquids afterwards. No fever at home. Did just fly in from [place] yesterday where he typically lives. Nursing Notes were all reviewed and agreed with or any disagreements were addressed in the HPI. REVIEW OF SYSTEMS :      Review of Systems  Positive history as above with normally no difficulty walking or talking or speaking or understanding speech. No recent headache.   Mild nasal congestion and some mild sore throat. No generalized fevers but positive for the dressing change and fatigue today, confusion, no one-sided weakness or facial droop or difficulty speaking or vision change. No fall or syncope. Positives and Pertinent negatives as per HPI. SURGICAL HISTORY     Past Surgical History:   Procedure Laterality Date    EYE SURGERY         CURRENTMEDICATIONS       Previous Medications    AZITHROMYCIN (ZITHROMAX) 250 MG TABLET    Take by mouth daily    DAPAGLIFLOZIN-METFORMIN HCL ER (XIGDUO XR) 5-1000 MG TB24    Take 1 tablet by mouth daily    LINAGLIPTIN (TRADJENTA) 5 MG TABLET    Take 5 mg by mouth daily    LOSARTAN (COZAAR) 50 MG TABLET    Take 50 mg by mouth daily    VITAMIN C (ASCORBIC ACID) 500 MG TABLET    Take 500 mg by mouth daily       ALLERGIES     Shrimp (diagnostic)    FAMILYHISTORY     History reviewed. No pertinent family history. SOCIAL HISTORY       Social History     Tobacco Use    Smoking status: Former     Types: Cigarettes     Quit date:      Years since quittin.2    Smokeless tobacco: Never   Vaping Use    Vaping Use: Never used   Substance Use Topics    Alcohol use: Not Currently     Comment: occ    Drug use: Never       SCREENINGS   NIH Stroke Scale  Interval: Reassessment  Level of Consciousness (1a): Alert  LOC Questions (1b): Answers one correctly  LOC Commands (1c): Performs one task correctly  Best Gaze (2): Normal  Visual (3): No visual loss  Facial Palsy (4): Normal symmetrical movement  Motor Arm, Left (5a): No drift  Motor Arm, Right (5b): No drift  Motor Leg, Left (6a): No drift  Motor Leg, Right (6b):  No drift  Limb Ataxia (7): Absent  Sensory (8): Normal  Best Language (9): No aphasia  Dysarthria (10): Normal  Extinction and Inattention (11): No abnormality  Total: 2    Bennett Coma Scale  Eye Opening: Spontaneous  Best Verbal Response: Confused  Best Motor Response: Obeys commands  Bennett Coma Scale Score: 14                CIWA Assessment  BP: (!) 172/74  Heart Rate: (!) 113           PHYSICAL EXAM  1 or more Elements     ED Triage Vitals [03/14/23 2021]   BP Temp Temp Source Heart Rate Resp SpO2 Height Weight   (!) 174/76 98.6 °F (37 °C) Oral 84 16 96 % 5' 9\" (1.753 m) 145 lb 8 oz (66 kg)       Physical Exam  Vitals and nursing note reviewed. Constitutional:       Appearance: Normal appearance. He is not diaphoretic. HENT:      Head: Normocephalic and atraumatic. Right Ear: External ear normal.      Left Ear: External ear normal.      Nose: Nose normal.      Mouth/Throat:      Mouth: Mucous membranes are moist.      Pharynx: No posterior oropharyngeal erythema. Eyes:      General:         Right eye: No discharge. Left eye: No discharge. Conjunctiva/sclera: Conjunctivae normal.   Cardiovascular:      Rate and Rhythm: Normal rate and regular rhythm. Pulses: Normal pulses. Heart sounds: Normal heart sounds. No murmur heard. No gallop. Pulmonary:      Effort: Pulmonary effort is normal. No respiratory distress. Breath sounds: Normal breath sounds. No wheezing, rhonchi or rales. Abdominal:      General: Bowel sounds are normal. There is no distension. Palpations: Abdomen is soft. Tenderness: There is no abdominal tenderness. There is no guarding or rebound. Musculoskeletal:         General: Normal range of motion. Cervical back: Normal range of motion and neck supple. No rigidity. Right lower leg: No edema. Left lower leg: No edema. Skin:     General: Skin is warm and dry. Capillary Refill: Capillary refill takes less than 2 seconds. Findings: No rash. Neurological:      Mental Status: He is lethargic, disoriented and confused. GCS: GCS eye subscore is 4. GCS verbal subscore is 4. GCS motor subscore is 6. Cranial Nerves: Cranial nerves 2-12 are intact. No cranial nerve deficit. Sensory: Sensation is intact. No sensory deficit. Motor: Weakness present. Coordination: Coordination abnormal. Finger-Nose-Finger Test normal.      Gait: Gait abnormal.      Comments: Truncal instability noted here, patient confused, having a hard time following instructions, unable to state correctly what year it is but is able to tell the month. Is dizzy and unable to stand or walk on his own. Psychiatric:         Mood and Affect: Mood normal.         Behavior: Behavior normal.         Cognition and Memory: Cognition is impaired. DIAGNOSTIC RESULTS   LABS:    Labs Reviewed   CBC WITH AUTO DIFFERENTIAL - Abnormal; Notable for the following components:       Result Value    Hemoglobin 13.4 (*)     Lymphocytes Absolute 0.5 (*)     All other components within normal limits   COMPREHENSIVE METABOLIC PANEL W/ REFLEX TO MG FOR LOW K - Abnormal; Notable for the following components:    Glucose 237 (*)     All other components within normal limits   PROTIME-INR   TROPONIN   POCT GLUCOSE       When ordered only abnormal lab results are displayed. All other labs were within normal range or not returned as of this dictation. EKG: When ordered, EKG's are interpreted by the Emergency Department Physician in the absence of a cardiologist.  Please see their note for interpretation of EKG. RADIOLOGY:   Non-plain film images such as CT, Ultrasound and MRI are read by the radiologist. Plain radiographic images are visualized and preliminarily interpreted by the ED Provider with the below findings:      Interpretation per the Radiologist below, if available at the time of this note:     [facility]   Final Result   1. No large vessel occlusion of the intracranial arteries. 2. Moderate focal stenosis of the A2 segment left anterior cerebral artery. 3. No significant arterial stenosis in the neck. CT HEAD WO CONTRAST   Final Result   Addendum (preliminary) 1 of 1   ADDENDUM:   Findings were conveyed to [address] at 10:14 pm on 3/14/2023. Final   1.  No acute intracranial abnormality.   2. 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   The findings were sent to the Radiology Results Communication Center at 10:12   pm on 3/14/2023 to be communicated to a licensed caregiver.              CT HEAD WO CONTRAST    Addendum Date: 3/14/2023    ADDENDUM: Findings were conveyed to ALEXANDRA ORTA at 10:14 pm on 3/14/2023.     Result Date: 3/14/2023  EXAMINATION: CT OF THE HEAD WITHOUT CONTRAST  3/14/2023 9:42 pm TECHNIQUE: CT of the head was performed without the administration of intravenous contrast. Automated exposure control, iterative reconstruction, and/or weight based adjustment of the mA/kV was utilized to reduce the radiation dose to as low as reasonably achievable. COMPARISON: None. HISTORY: ORDERING SYSTEM PROVIDED HISTORY: Stroke TECHNOLOGIST PROVIDED HISTORY: Has a \"code stroke\" or \"stroke alert\" been called?->Yes Reason for exam:->Stroke Decision Support Exception - unselect if not a suspected or confirmed emergency medical condition->Emergency Medical Condition (MA) Reason for Exam: stroke FINDINGS: BRAIN/VENTRICLES: There is no acute intracranial hemorrhage, mass effect or midline shift.  No abnormal extra-axial fluid collection.  The gray-white differentiation is maintained without evidence of an acute infarct.  There is no evidence of hydrocephalus. Senescent parenchymal volume loss is present with mild chronic white matter microvascular ischemic changes.  There is a chronic lacunar infarct in the left josselin and left cerebellar encephalomalacia in keeping with sequela of prior infarct. ORBITS: The visualized portion of the orbits demonstrate no acute abnormality. SINUSES: The visualized paranasal sinuses and mastoid air cells demonstrate no acute abnormality. SOFT TISSUES/SKULL:  No acute abnormality of the visualized skull or soft tissues.     1. No acute intracranial abnormality. 2. 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 The  findings were sent to the Radiology Results [id number] at 10:12 pm on 3/14/2023 to be communicated to a licensed caregiver. CTA HEAD NECK W CONTRAST    Result Date: 3/14/2023  EXAMINATION: CTA OF THE HEAD AND NECK WITH CONTRAST 3/14/2023 9:42 pm: TECHNIQUE: CTA of the head and neck was performed with the administration of intravenous contrast. Multiplanar reformatted images are provided for review. MIP images are provided for review. Stenosis of the internal carotid arteries measured using NASCET criteria. Automated exposure control, iterative reconstruction, and/or weight based adjustment of the mA/kV was utilized to reduce the radiation dose to as low as reasonably achievable. COMPARISON: None. HISTORY: ORDERING SYSTEM PROVIDED HISTORY: Stroke TECHNOLOGIST PROVIDED HISTORY: Has a \"code stroke\" or \"stroke alert\" been called? ->Yes Reason for exam:->Stroke Decision Support Exception - unselect if not a suspected or confirmed emergency medical condition->Emergency Medical Condition (MA) Reason for Exam: stroke FINDINGS: CTA NECK: AORTIC ARCH/ARCH VESSELS: No dissection or arterial injury. No significant stenosis of the brachiocephalic or subclavian arteries. CAROTID ARTERIES: No dissection, arterial injury, or hemodynamically significant stenosis by NASCET criteria. VERTEBRAL ARTERIES: No dissection, arterial injury, or significant stenosis. SOFT TISSUES: The lung apices are clear other than a chronic calcified right upper lobe granuloma. No cervical or superior mediastinal lymphadenopathy. The larynx and pharynx are unremarkable. No acute abnormality of the salivary and thyroid glands. BONES: No acute osseous abnormality. CTA HEAD: ANTERIOR CIRCULATION: No significant stenosis of the internal carotid arteries. Moderate focal stenosis of the A2 segment left anterior cerebral artery. No significant stenosis of the right anterior or bilateral middle cerebral arteries. No aneurysm.  POSTERIOR CIRCULATION: No significant stenosis of the vertebral, basilar, or posterior cerebral arteries. No aneurysm. OTHER: No dural venous sinus thrombosis on this non-dedicated study. BRAIN: No mass effect or midline shift. No extra-axial fluid collection. The gray-white differentiation is maintained. 1. No large vessel occlusion of the intracranial arteries. 2. Moderate focal stenosis of the A2 segment left anterior cerebral artery. 3. No significant arterial stenosis in the neck. No results found. PROCEDURES   Unless otherwise noted below, none     Procedures    CRITICAL CARE TIME (.cctime)   Critical Care  There was a high probability of life-threatening clinical deterioration in the patient's condition requiring my urgent intervention. I personally saw the patient and independently provided greater than 45 minutes of non-concurrent critical care out of the total shared critical care time provided. tNK also considered but patient is outside the appropriate window. Critical care required due to patients strokelike symptoms with truncal instability, dizziness, inability to walk, confusion and requiring stroke order set work-up and admission for strokelike symptoms for inpatient MRI and further care. PAST MEDICAL HISTORY      has a past medical history of Diabetes mellitus ([address] and Hypertension. EMERGENCY DEPARTMENT COURSE and DIFFERENTIAL DIAGNOSIS/MDM:   Vitals:    Vitals:    03/14/23 2037 03/14/23 2330 03/14/23 2345 03/15/23 0000   BP: (!) 175/76 (!) 157/65 (!) 166/72 (!) 172/74   Pulse: 83 91 100 (!) 113   Resp: 19 16 19 25   Temp: 98.6 °F (37 °C)      TempSrc: Oral      SpO2: 96% 97% 97% 95%   Weight: 145 lb 8 oz (66 kg)      Height:           Patient was given the following medications:  Medications   iopamidol (ISOVUE-370) 76 % injection 75 mL (75 mLs IntraVENous Given 3/14/23 2208)             Is this patient to be included in the SEP-1 Core Measure due to severe sepsis or septic shock? No   Exclusion criteria - the patient is NOT to be included for SEP-1 Core Measure due to: Infection is not suspected        Chronic Conditions affecting care:    has a past medical history of Diabetes mellitus ([address] and Hypertension. CONSULTS: (Who and What was discussed)  None          Records Reviewed (External and Source)   CC/HPI Summary, DDx, ED Course, and Reassessment: Patient presents as above and evaluation and treatment is begun here. Patient is resting easily on exam but he has the neurologic deficits as above. Work-up is begun. CT head returns without sign of bleed but shows old infarcts in the posterior brain. CTA head and neck returns also without acute large vessel obstruction seen however positive for some arterial stenosis. Labs show no elevated troponin. INR normal.  CBC looks good with strong H&H and no leukocytosis or thrombocytopenia. CMP looks good as well with glucose of but elevated to 37 consistent with patient's known diabetes. Disposition Considerations (tests considered but not done, Admit vs D/C, Shared Decision Making, Pt Expectation of Test or Tx.): Per stroke team that patient is outside the window for tNK though this is considered. Vital signs stable. Patient presented more than 12 hours after his symptoms here. CT showed no acute bleed but positive chronic infarcts in left josselin and left cerebellum. CTA showed no acute large vessel occlusion, some stenosis noted. Patient needs admitted for strokelike symptoms with altered mental status, postural instability of trunk, dizziness, for inpatient MRI and further care. Patient to hospitalist placed at this time. Patient is in fair condition. I am the Primary Clinician of Record. FINAL IMPRESSION      1. Stroke-like symptoms    2. Altered mental status, unspecified altered mental status type    3. Postural instability of trunk    4.  Dizziness          DISPOSITION/PLAN     DISPOSITION Decision To Admit 03/15/2023 12:07:32 AM      PATIENT REFERRED TO:  No follow-up provider specified.     DISCHARGE MEDICATIONS:  New Prescriptions    No medications on file       DISCONTINUED MEDICATIONS:  Discontinued Medications    No medications on file              (Please note that portions of this note were completed with a voice recognition program.  Efforts were made to edit the dictations but occasionally words are mis-transcribed.)    Shorty Mckenna PA-C (electronically signed)        Shorty Mckenna PA-C  03/15/23 0045

## 2023-03-15 NOTE — PROGRESS NOTES
4 Eyes Skin Assessment     NAME:  Wesly Francisco  YOB: 1945  MEDICAL RECORD NUMBER:  [id number]    The patient is being assessed for  Admission    I agree that One RN has performed a thorough Head to Toe Skin Assessment on the patient. ALL assessment sites listed below have been assessed. Areas assessed by both nurses:    Head, Face, Ears, Shoulders, Back, Chest, Arms, Elbows, Hands, Sacrum. Buttock, Coccyx, Ischium, and Legs. Feet and Heels        Does the Patient have a Wound?  No noted wound(s)       Mihai Prevention initiated by RN: Yes   Wound Care Orders initiated by RN: NA    Pressure Injury (Stage 3,4, Unstageable, DTI, NWPT, and Complex wounds) if present, place referral order by RN under : NA    New and Established Ostomies, if present place, referral order under : NA      Nurse 1 eSignature: Electronically signed by Nikki Restrepo RN on 3/15/23 at 4:30 AM EDT    **SHARE this note so that the co-signing nurse can place an eSignature**    Nurse 2 eSignature: Electronically signed by Alyssa Sears RN on 3/15/23 at 4:31 AM EDT

## 2023-03-15 NOTE — PROGRESS NOTES
NAME:  Marcia Zamora  YOB: 1945  MEDICAL RECORD NUMBER:  [id number]  TODAYS DATE:  3/15/2023    Discussed personal risk factors for Stroke/TIA with patient/family, and ways to reduce the risk for a recurrent stroke. Patient's personal risk factors which were identified are:     [] Alcohol Abuse: check with your physician before any alcohol consumption. [] Atrial fibrillation: may cause blood clots. [] Drug Abuse: Seek help, talk with your doctor  [] Clotting Disorder  [x] Diabetes  [x] Family history of stroke or heart disease  [x] High Blood Pressure/Hypertension: work with your physician.  [] High cholesterol: monitor cholesterol levels with your physician.   [] Overweight/Obesity: work with your physician for your ideal body weight. [x] Physical Inactivity: get regular exercise as directed by your physician. [] Personal history of previous TIA or stroke  [] Poor Diet; decrease salt (sodium) in your diet, follow diet directed by physician. [] Smoking: Cigarette/Cigar: stop smoking. Advised pt. that you can reduce your risk for stroke/TIA by modifying/controlling the risk factors that you have. Pt.advised to take the medications as prescribed, which will be detailed in the discharge instructions, and to not stop taking them without consulting their physician. In addition, pt. advised to maintain a healthy diet, exercise regularly and to not smoke. [facility]'s Stroke treatment and prevention, Managing your recovery  notebook  provided and/or reviewed  with patient/family. The notebook includes, but not limited to, sections addressing warning signs & symptoms of a stroke, which are: sudden numbness or weakness especially on one side of the body, sudden confusion, difficulty speaking or understanding, sudden changes in vision, sudden dizziness or loss of balance/ coordination, sudden severe headache, syncope and seizure.   The need to call EMS (911) immediately if signs & symptoms occur is emphasized . The notebook also provides education on Stroke community resources and stroke advocacy. The need for follow-up after discharge was highlighted with patient/family with them being able to repeat understanding of the importance of this.       Electronically signed by Yonas Monroy RN on 3/15/2023 at 4:36 AM

## 2023-03-16 ENCOUNTER — APPOINTMENT (OUTPATIENT)
Dept: GENERAL RADIOLOGY | Age: 78
DRG: 056 | End: 2023-03-16
Payer: MEDICARE

## 2023-03-16 VITALS
DIASTOLIC BLOOD PRESSURE: 77 MMHG | WEIGHT: 137.79 LBS | RESPIRATION RATE: 16 BRPM | HEART RATE: 75 BPM | BODY MASS INDEX: 20.41 KG/M2 | HEIGHT: 69 IN | OXYGEN SATURATION: 96 % | SYSTOLIC BLOOD PRESSURE: 137 MMHG | TEMPERATURE: 97.5 F

## 2023-03-16 PROBLEM — R42 DIZZINESS: Status: ACTIVE | Noted: 2023-03-16

## 2023-03-16 PROBLEM — R27.0 ATAXIA: Status: RESOLVED | Noted: 2023-03-15 | Resolved: 2023-03-16

## 2023-03-16 LAB
ALBUMIN SERPL-MCNC: 3.8 G/DL (ref 3.4–5)
ALBUMIN/GLOB SERPL: 1.2 {RATIO} (ref 1.1–2.2)
ALP SERPL-CCNC: 63 U/L (ref 40–129)
ALT SERPL-CCNC: 12 U/L (ref 10–40)
AMPHETAMINES UR QL SCN>1000 NG/ML: NORMAL
ANION GAP SERPL CALCULATED.3IONS-SCNC: 17 MMOL/L (ref 3–16)
AST SERPL-CCNC: 15 U/L (ref 15–37)
BACTERIA URNS QL MICRO: ABNORMAL /HPF
BARBITURATES UR QL SCN>200 NG/ML: NORMAL
BASOPHILS # BLD: 0 K/UL (ref 0–0.2)
BASOPHILS NFR BLD: 0.6 %
BENZODIAZ UR QL SCN>200 NG/ML: NORMAL
BILIRUB SERPL-MCNC: 0.5 MG/DL (ref 0–1)
BILIRUB UR QL STRIP.AUTO: NEGATIVE
BUN SERPL-MCNC: 18 MG/DL (ref 7–20)
CALCIUM SERPL-MCNC: 9.3 MG/DL (ref 8.3–10.6)
CANNABINOIDS UR QL SCN>50 NG/ML: NORMAL
CHLORIDE SERPL-SCNC: 103 MMOL/L (ref 99–110)
CLARITY UR: CLEAR
CO2 SERPL-SCNC: 22 MMOL/L (ref 21–32)
COCAINE UR QL SCN: NORMAL
COLOR UR: YELLOW
CREAT SERPL-MCNC: 0.7 MG/DL (ref 0.8–1.3)
DEPRECATED RDW RBC AUTO: 14.7 % (ref 12.4–15.4)
DRUG SCREEN COMMENT UR-IMP: NORMAL
EOSINOPHIL # BLD: 0.1 K/UL (ref 0–0.6)
EOSINOPHIL NFR BLD: 0.8 %
EPI CELLS #/AREA URNS AUTO: 0 /HPF (ref 0–5)
FENTANYL SCREEN, URINE: NORMAL
FLUAV RNA UPPER RESP QL NAA+PROBE: NEGATIVE
FLUBV AG NPH QL: NEGATIVE
GFR SERPLBLD CREATININE-BSD FMLA CKD-EPI: >60 ML/MIN/{1.73_M2}
GLUCOSE BLD-MCNC: 129 MG/DL (ref 70–99)
GLUCOSE BLD-MCNC: 160 MG/DL (ref 70–99)
GLUCOSE BLD-MCNC: 213 MG/DL (ref 70–99)
GLUCOSE BLD-MCNC: 236 MG/DL (ref 70–99)
GLUCOSE SERPL-MCNC: 158 MG/DL (ref 70–99)
GLUCOSE UR STRIP.AUTO-MCNC: >=1000 MG/DL
HCT VFR BLD AUTO: 39.1 % (ref 40.5–52.5)
HGB BLD-MCNC: 12.9 G/DL (ref 13.5–17.5)
HGB UR QL STRIP.AUTO: NEGATIVE
HYALINE CASTS #/AREA URNS AUTO: 1 /LPF (ref 0–8)
KETONES UR STRIP.AUTO-MCNC: 40 MG/DL
LEUKOCYTE ESTERASE UR QL STRIP.AUTO: NEGATIVE
LYMPHOCYTES # BLD: 1.4 K/UL (ref 1–5.1)
LYMPHOCYTES NFR BLD: 17.9 %
MAGNESIUM SERPL-MCNC: 1.9 MG/DL (ref 1.8–2.4)
MCH RBC QN AUTO: 30 PG (ref 26–34)
MCHC RBC AUTO-ENTMCNC: 33 G/DL (ref 31–36)
MCV RBC AUTO: 91 FL (ref 80–100)
METHADONE UR QL SCN>300 NG/ML: NORMAL
MONOCYTES # BLD: 1 K/UL (ref 0–1.3)
MONOCYTES NFR BLD: 12.9 %
NEUTROPHILS # BLD: 5.2 K/UL (ref 1.7–7.7)
NEUTROPHILS NFR BLD: 67.8 %
NITRITE UR QL STRIP.AUTO: NEGATIVE
OPIATES UR QL SCN>300 NG/ML: NORMAL
OXYCODONE UR QL SCN: NORMAL
PCP UR QL SCN>25 NG/ML: NORMAL
PERFORMED ON: ABNORMAL
PH UR STRIP.AUTO: 5 [PH] (ref 5–8)
PH UR STRIP: 5 [PH]
PHOSPHATE SERPL-MCNC: 3.2 MG/DL (ref 2.5–4.9)
PLATELET # BLD AUTO: 204 K/UL (ref 135–450)
PMV BLD AUTO: 8.8 FL (ref 5–10.5)
POTASSIUM SERPL-SCNC: 4 MMOL/L (ref 3.5–5.1)
PROCALCITONIN SERPL IA-MCNC: 0.09 NG/ML (ref 0–0.15)
PROT SERPL-MCNC: 6.9 G/DL (ref 6.4–8.2)
PROT UR STRIP.AUTO-MCNC: ABNORMAL MG/DL
RBC # BLD AUTO: 4.3 M/UL (ref 4.2–5.9)
RBC CLUMPS #/AREA URNS AUTO: 0 /HPF (ref 0–4)
SARS-COV-2 RDRP RESP QL NAA+PROBE: DETECTED
SODIUM SERPL-SCNC: 142 MMOL/L (ref 136–145)
SP GR UR STRIP.AUTO: 1.04 (ref 1–1.03)
UA DIPSTICK W REFLEX MICRO PNL UR: YES
URN SPEC COLLECT METH UR: ABNORMAL
UROBILINOGEN UR STRIP-ACNC: 0.2 E.U./DL
WBC # BLD AUTO: 7.6 K/UL (ref 4–11)
WBC #/AREA URNS AUTO: 0 /HPF (ref 0–5)

## 2023-03-16 PROCEDURE — 84100 ASSAY OF PHOSPHORUS: CPT

## 2023-03-16 PROCEDURE — 81001 URINALYSIS AUTO W/SCOPE: CPT

## 2023-03-16 PROCEDURE — 80053 COMPREHEN METABOLIC PANEL: CPT

## 2023-03-16 PROCEDURE — 36415 COLL VENOUS BLD VENIPUNCTURE: CPT

## 2023-03-16 PROCEDURE — 6370000000 HC RX 637 (ALT 250 FOR IP): Performed by: INTERNAL MEDICINE

## 2023-03-16 PROCEDURE — 71045 X-RAY EXAM CHEST 1 VIEW: CPT

## 2023-03-16 PROCEDURE — 80307 DRUG TEST PRSMV CHEM ANLYZR: CPT

## 2023-03-16 PROCEDURE — 85025 COMPLETE CBC W/AUTO DIFF WBC: CPT

## 2023-03-16 PROCEDURE — 87635 SARS-COV-2 COVID-19 AMP PRB: CPT

## 2023-03-16 PROCEDURE — 83735 ASSAY OF MAGNESIUM: CPT

## 2023-03-16 PROCEDURE — 84145 PROCALCITONIN (PCT): CPT

## 2023-03-16 PROCEDURE — 87804 INFLUENZA ASSAY W/OPTIC: CPT

## 2023-03-16 PROCEDURE — 2580000003 HC RX 258: Performed by: INTERNAL MEDICINE

## 2023-03-16 RX ORDER — ATORVASTATIN CALCIUM 10 MG/1
10 TABLET, FILM COATED ORAL NIGHTLY
Qty: 30 TABLET | Refills: 3 | Status: SHIPPED | OUTPATIENT
Start: 2023-03-16

## 2023-03-16 RX ORDER — GUAIFENESIN 200 MG/10ML
100 LIQUID ORAL 4 TIMES DAILY PRN
Qty: 236 ML | Refills: 0 | Status: SHIPPED | OUTPATIENT
Start: 2023-03-16

## 2023-03-16 RX ORDER — ASPIRIN 81 MG/1
81 TABLET ORAL DAILY
Qty: 30 TABLET | Refills: 3 | Status: SHIPPED | OUTPATIENT
Start: 2023-03-16

## 2023-03-16 RX ORDER — GUAIFENESIN 200 MG/10ML
200 LIQUID ORAL EVERY 4 HOURS PRN
Status: DISCONTINUED | OUTPATIENT
Start: 2023-03-16 | End: 2023-03-16 | Stop reason: HOSPADM

## 2023-03-16 RX ORDER — CARVEDILOL 6.25 MG/1
6.25 TABLET ORAL 2 TIMES DAILY WITH MEALS
Status: DISCONTINUED | OUTPATIENT
Start: 2023-03-16 | End: 2023-03-16 | Stop reason: HOSPADM

## 2023-03-16 RX ADMIN — LOSARTAN POTASSIUM 50 MG: 50 TABLET, FILM COATED ORAL at 09:13

## 2023-03-16 RX ADMIN — ERYTHROMYCIN: 5 OINTMENT OPHTHALMIC at 06:30

## 2023-03-16 RX ADMIN — ERYTHROMYCIN: 5 OINTMENT OPHTHALMIC at 17:07

## 2023-03-16 RX ADMIN — OXYCODONE HYDROCHLORIDE AND ACETAMINOPHEN 500 MG: 500 TABLET ORAL at 09:13

## 2023-03-16 RX ADMIN — CARVEDILOL 6.25 MG: 6.25 TABLET, FILM COATED ORAL at 09:13

## 2023-03-16 RX ADMIN — INSULIN LISPRO 1 UNITS: 100 INJECTION, SOLUTION INTRAVENOUS; SUBCUTANEOUS at 13:24

## 2023-03-16 RX ADMIN — SODIUM CHLORIDE, PRESERVATIVE FREE 10 ML: 5 INJECTION INTRAVENOUS at 09:13

## 2023-03-16 RX ADMIN — ASPIRIN 81 MG: 81 TABLET, COATED ORAL at 09:13

## 2023-03-16 RX ADMIN — CARVEDILOL 6.25 MG: 6.25 TABLET, FILM COATED ORAL at 17:07

## 2023-03-16 RX ADMIN — INSULIN LISPRO 1 UNITS: 100 INJECTION, SOLUTION INTRAVENOUS; SUBCUTANEOUS at 17:06

## 2023-03-16 ASSESSMENT — PAIN SCALES - GENERAL: PAINLEVEL_OUTOF10: 0

## 2023-03-16 NOTE — PROGRESS NOTES
Patient resting in bed, alert and oriented. Wife at bedside. Low grade temp overnight, PRN tylenol given with positive results. Patient c/o feeling worse, NP made aware. Home BP medications to resume this morning for increased BP. Patient safe with call light in reach and bed alarm on for safety.

## 2023-03-16 NOTE — PROGRESS NOTES
Hospital Medicine Progress Note     Date:  3/16/2023    PCP: No primary care provider on file. (Tel: None)    Date of Admission: 3/14/2023    Chief complaint:   Chief Complaint   Patient presents with    Fatigue    Emesis     Pt is here from [employer] visiting his daughter started this morning with congestion,sore throat and cough then about 1 hour ago started feeling nausea and had 1 episode of emesis       Brief admission history: 70-year-old male with history of uncontrolled diabetes type 2 with hyperglycemia, hypertension associated with diabetes, history of CVA (evidence of prior infection noted on CT-head), who presents to the emergency room with complaints of dizziness, feeling of balance during ambulation, falls noted on waking up at 2 PM on 3/14/2023 (reportedly last known well around 8 AM on 3/14/2023). He has had longstanding history of cough, not worse. He does have repeated coughing spells during meal intake. He does not have fever or chills. He has not had discomfort urinating; he has chronic urinary incontinence for at least a few years. In the emergency room, CT-head was unremarkable for acute pathology. CTA-head/neck revealed moderate focal stenosis of the A2 segment of left anterior carotid artery. Patient admitted for further evaluation for CVA. Assessment/plan:  Dizziness and transient disorientation. Patient admitted for evaluation of possible CVA. Continue antiplatelet therapy, statin. CT-head was unremarkable for acute pathology. CT-head/neck with moderate focal stenosis of A2 segment of left anterior carotid artery. MRI-brain is negative for acute infarct; he does have evidence of prior infarct. MRI-brain reveals evidence of chronic infarct in left cerebellum and chronic lacunar infarcts involving both thalami and left josselin. He will continue aspirin and statin, lifelong.    I wonder if presentation is a recrudescence from prior CVA, although patient and family unaware of prior CVA. Will arrange for home PT/OT at discharge. Acute bronchitis. Screening for flu, COVID and other viruses. Checking procalcitonin, CXR. Robitussin for cough. Generalized weakness. Will arrange for therapy at discharge. Other comorbidities: history of uncontrolled diabetes type 2 with hyperglycemia, hypertension associated with diabetes, history of CVA (evidence of prior infection noted on CT-head). Diet: ADULT DIET; Regular; 3 carb choices (45 gm/meal); Low Fat/Low Chol/High Fiber/2 gm Na    Code status: Full Code   ----------  Subjective  He had some cough and low grade temperature last night, Tm 100F. Objective  Physical exam:  Vitals: BP (!) 143/74   Pulse 69   Temp 98.4 °F (36.9 °C) (Oral)   Resp 13   Ht 5' 9\" (1.753 m)   Wt 137 lb 12.6 oz (62.5 kg)   SpO2 95%   BMI 20.35 kg/m²   Gen/overall appearance: Not in acute distress. Alert. Oriented X3  Head: Normocephalic, atraumatic  Eyes: EOMI, good acuity  ENT: Oral mucosa moist  Neck: No JVD, thyromegaly  CVS: Nml S1S2, no MRG, RRR  Pulm: Clear bilaterally. No crackles/wheezes  Gastrointestinal: Soft, NT/ND, +BS  Musculoskeletal: No edema. Warm  Neuro: No focal deficit. Moves extremity spontaneously. Psychiatry: Appropriate affect. Not agitated. Skin: Warm, dry with normal turgor. No rash  Capillary refill: Brisk,< 3 seconds   Peripheral Pulses: +2 palpable, equal bilaterally      24HR INTAKE/OUTPUT:    Intake/Output Summary (Last 24 hours) at 3/16/2023 0839  Last data filed at 3/15/2023 0920  Gross per 24 hour   Intake 240 ml   Output --   Net 240 ml     I/O last 3 completed shifts: In: 480 [P.O.:480]  Out: -   No intake/output data recorded.   Meds:    carvedilol  6.25 mg Oral BID     sodium chloride flush  10 mL IntraVENous 2 times per day    aspirin  81 mg Oral Daily    Or    aspirin  300 mg Rectal Daily    atorvastatin  80 mg Oral Nightly    insulin lispro  0-4 Units SubCUTAneous TID     insulin lispro  0-4 Units SubCUTAneous Nightly    losartan  50 mg Oral Daily    vitamin C  500 mg Oral Daily    erythromycin   Left Eye 3 times per day     Infusions:    sodium chloride      dextrose       PRN Meds: guaiFENesin, sodium chloride flush, sodium chloride, potassium chloride, magnesium sulfate, promethazine **OR** ondansetron, acetaminophen **OR** acetaminophen, perflutren lipid microspheres, polyethylene glycol, labetalol, glucose, dextrose bolus **OR** dextrose bolus, glucagon (rDNA), dextrose    Labs/imaging:  CBC:   Recent Labs     03/14/23  2145 03/15/23  0402 03/16/23  0504   WBC 8.1 6.4 7.6   HGB 13.4* 12.3* 12.9*    196 204     BMP:    Recent Labs     03/14/23 2145 03/16/23  0504    142   K 4.7 4.0   CL 99 103   CO2 26 22   BUN 14 18   CREATININE 0.8 0.7*   GLUCOSE 237* 158*     Hepatic:   Recent Labs     03/14/23 2145 03/16/23  0504   AST 28 15   ALT 16 12   BILITOT 0.5 0.5   ALKPHOS 72 63       Bre Koroma MD  -------------------------------  Rounding hospitalist

## 2023-03-16 NOTE — PLAN OF CARE
Problem: Discharge Planning  Goal: Discharge to home or other facility with appropriate resources  3/15/2023 2207 by Tiara Shields RN  Outcome: Progressing  Flowsheets (Taken 3/15/2023 2204)  Discharge to home or other facility with appropriate resources: Identify barriers to discharge with patient and caregiver         Problem: Skin/Tissue Integrity  Goal: Absence of new skin breakdown  Description: 1. Monitor for areas of redness and/or skin breakdown  2. Assess vascular access sites hourly  3. Every 4-6 hours minimum:  Change oxygen saturation probe site  4. Every 4-6 hours:  If on nasal continuous positive airway pressure, respiratory therapy assess nares and determine need for appliance change or resting period.   3/15/2023 2207 by Tiara Shields RN  Outcome: Progressing      Problem: Safety - Adult  Goal: Free from fall injury  3/15/2023 2207 by Tiara Shields RN  Outcome: Progressing         Problem: ABCDS Injury Assessment  Goal: Absence of physical injury  3/15/2023 2207 by Tiara Shields RN  Outcome: Progressing         Problem: Neurosensory - Adult  Goal: Achieves stable or improved neurological status  3/15/2023 2207 by Tiara Shields RN  Outcome: Progressing  Flowsheets (Taken 3/15/2023 2204)  Achieves stable or improved neurological status: Assess for and report changes in neurological status      Goal: Achieves maximal functionality and self care  3/15/2023 2207 by Tiara Shields RN  Outcome: Progressing  Flowsheets (Taken 3/15/2023 2204)  Achieves maximal functionality and self care: Monitor swallowing and airway patency with patient fatigue and changes in neurological status      Problem: Pain  Goal: Verbalizes/displays adequate comfort level or baseline comfort level  Outcome: Progressing

## 2023-03-16 NOTE — DISCHARGE SUMMARY
Hospital Discharge Summary    Patient's PCP: No primary care provider on file. Admit Date: 3/14/2023   Discharge Date: 3/16/2023    Admitting Physician: Dr. Akira Umaña DO  Discharge Physician: Dr. Myla Patrick MD     Consults:   IP CONSULT TO NEUROLOGY    Brief HPI: Patient admitted with dizziness. Brief hospital course: 72-year-old male with history of uncontrolled diabetes type 2 with hyperglycemia, hypertension associated with diabetes, history of CVA (evidence of prior infection noted on CT-head), who presents to the emergency room with complaints of dizziness, feeling of balance during ambulation, falls noted on waking up at 2 PM on 3/14/2023 (reportedly last known well around 8 AM on 3/14/2023). He has had longstanding history of cough, not worse. He does have repeated coughing spells during meal intake. He does not have fever or chills. He has not had discomfort urinating; he has chronic urinary incontinence for at least a few years. In the emergency room, CT-head was unremarkable for acute pathology. CTA-head/neck revealed moderate focal stenosis of the A2 segment of left anterior carotid artery. Patient admitted for further evaluation for CVA. Assessment/plan:  Dizziness and transient disorientation. Patient admitted for evaluation of possible CVA. Continue antiplatelet therapy, statin. CT-head was unremarkable for acute pathology. CT-head/neck with moderate focal stenosis of A2 segment of left anterior carotid artery. MRI-brain is negative for acute infarct; he does have evidence of prior infarct. MRI-brain reveals evidence of chronic infarct in left cerebellum and chronic lacunar infarcts involving both thalami and left josselin. He will continue aspirin and statin, lifelong. I wonder if presentation is a recrudescence from prior CVA, although patient and family unaware of prior CVA. Acute bronchitis secondary to COVID. Patient is not hypoxic.   He does not have pneumonia. As needed Robitussin for cough. Generalized weakness. Patient has been evaluated by therapy and they recommend home assist as needed. Other comorbidities: history of uncontrolled diabetes type 2 with hyperglycemia, hypertension associated with diabetes, history of CVA (evidence of prior infection noted on CT-head). Disposition. Stable for discharge home. Invasive procedures:  None. Discharge Diagnoses:   See above. Physical Exam: /61   Pulse 75   Temp 99 °F (37.2 °C) (Oral)   Resp 16   Ht 5' 9\" (1.753 m)   Wt 137 lb 12.6 oz (62.5 kg)   SpO2 96%   BMI 20.35 kg/m²   Gen/overall appearance: Not in acute distress. Alert. Oriented x3. Head: Normocephalic, atraumatic  Eyes: EOMI, good acuity  ENT: Oral mucosa moist  Neck: No JVD, thyromegaly  CVS: Nml S1S2, no MRG, RRR  Pulm: Clear bilaterally. No crackles/wheezes  Gastrointestinal: Soft, NT/ND, +BS  Musculoskeletal: No edema. Warm  Neuro: No focal deficit. Moves extremity spontaneously. Psychiatry: Appropriate affect. Not agitated. Skin: Warm, dry with normal turgor. No rash  Capillary refill: Brisk,< 3 seconds   Peripheral Pulses: +2 palpable, equal bilaterally     Significant diagnostic studies that may require follow up:  CT HEAD WO CONTRAST    Addendum Date: 3/14/2023    ADDENDUM: Findings were conveyed to [address] at 10:14 pm on 3/14/2023. Result Date: 3/14/2023  EXAMINATION: CT OF THE HEAD WITHOUT CONTRAST  3/14/2023 9:42 pm TECHNIQUE: CT of the head was performed without the administration of intravenous contrast. Automated exposure control, iterative reconstruction, and/or weight based adjustment of the mA/kV was utilized to reduce the radiation dose to as low as reasonably achievable. COMPARISON: None. HISTORY: ORDERING SYSTEM PROVIDED HISTORY: Stroke TECHNOLOGIST PROVIDED HISTORY: Has a \"code stroke\" or \"stroke alert\" been called? ->Yes Reason for exam:->Stroke Decision Support Exception - unselect if not a suspected or confirmed emergency medical condition->Emergency Medical Condition (MA) Reason for Exam: stroke FINDINGS: BRAIN/VENTRICLES: There is no acute intracranial hemorrhage, mass effect or midline shift. No abnormal extra-axial fluid collection. The gray-white differentiation is maintained without evidence of an acute infarct. There is no evidence of hydrocephalus. Senescent parenchymal volume loss is present with mild chronic white matter microvascular ischemic changes. There is a chronic lacunar infarct in the left josselin and left cerebellar encephalomalacia in keeping with sequela of prior infarct. ORBITS: The visualized portion of the orbits demonstrate no acute abnormality. SINUSES: The visualized paranasal sinuses and mastoid air cells demonstrate no acute abnormality. SOFT TISSUES/SKULL:  No acute abnormality of the visualized skull or soft tissues. 1. No acute intracranial abnormality. 2. 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 The findings were sent to the Radiology Results [address] at 10:12 pm on 3/14/2023 to be communicated to a licensed caregiver. XR CHEST PORTABLE    Result Date: 3/16/2023  EXAMINATION: ONE XRAY VIEW OF THE CHEST 3/16/2023 8:39 am COMPARISON: None. HISTORY: ORDERING SYSTEM PROVIDED HISTORY: cough TECHNOLOGIST PROVIDED HISTORY: Reason for exam:->cough Reason for Exam: cough FINDINGS: The lungs are without acute focal process. There is no effusion or pneumothorax. The cardiomediastinal silhouette is without acute process. The osseous structures are without acute process. No acute process. CTA HEAD NECK W CONTRAST    Result Date: 3/14/2023  EXAMINATION: CTA OF THE HEAD AND NECK WITH CONTRAST 3/14/2023 9:42 pm: TECHNIQUE: CTA of the head and neck was performed with the administration of intravenous contrast. Multiplanar reformatted images are provided for review. MIP images are provided for review.  Stenosis of the internal carotid arteries measured using NASCET criteria. Automated exposure control, iterative reconstruction, and/or weight based adjustment of the mA/kV was utilized to reduce the radiation dose to as low as reasonably achievable. COMPARISON: None. HISTORY: ORDERING SYSTEM PROVIDED HISTORY: Stroke TECHNOLOGIST PROVIDED HISTORY: Has a \"code stroke\" or \"stroke alert\" been called? ->Yes Reason for exam:->Stroke Decision Support Exception - unselect if not a suspected or confirmed emergency medical condition->Emergency Medical Condition (MA) Reason for Exam: stroke FINDINGS: CTA NECK: AORTIC ARCH/ARCH VESSELS: No dissection or arterial injury. No significant stenosis of the brachiocephalic or subclavian arteries. CAROTID ARTERIES: No dissection, arterial injury, or hemodynamically significant stenosis by NASCET criteria. VERTEBRAL ARTERIES: No dissection, arterial injury, or significant stenosis. SOFT TISSUES: The lung apices are clear other than a chronic calcified right upper lobe granuloma. No cervical or superior mediastinal lymphadenopathy. The larynx and pharynx are unremarkable. No acute abnormality of the salivary and thyroid glands. BONES: No acute osseous abnormality. CTA HEAD: ANTERIOR CIRCULATION: No significant stenosis of the internal carotid arteries. Moderate focal stenosis of the A2 segment left anterior cerebral artery. No significant stenosis of the right anterior or bilateral middle cerebral arteries. No aneurysm. POSTERIOR CIRCULATION: No significant stenosis of the vertebral, basilar, or posterior cerebral arteries. No aneurysm. OTHER: No dural venous sinus thrombosis on this non-dedicated study. BRAIN: No mass effect or midline shift. No extra-axial fluid collection. The gray-white differentiation is maintained. 1. No large vessel occlusion of the intracranial arteries. 2. Moderate focal stenosis of the A2 segment left anterior cerebral artery. 3. No significant arterial stenosis in the neck.      MRI brain without contrast    Result Date: 3/15/2023  EXAMINATION: MRI OF THE BRAIN WITHOUT CONTRAST  3/15/2023 9:43 am TECHNIQUE: Multiplanar multisequence MRI of the brain was performed without the administration of intravenous contrast. COMPARISON: None. HISTORY: ORDERING SYSTEM PROVIDED HISTORY: cva TECHNOLOGIST PROVIDED HISTORY: Reason for exam:->cva Decision Support Exception - unselect if not a suspected or confirmed emergency medical condition->Emergency Medical Condition (MA) Reason for Exam: cva Initial evaluation. FINDINGS: INTRACRANIAL STRUCTURES/VENTRICLES: There is no acute infarct. There is a chronic infarct involving the left cerebellum. Chronic lacunar infarcts involving the bilateral thalami and left josselin. No mass effect or midline shift. No evidence of an acute intracranial hemorrhage. A punctate focus of susceptibility is seen within the bilateral thalami and right cerebellum. These may represent sequelae of remote micro hemorrhages. Areas of T2 FLAIR hyperintensity are seen in the periventricular and subcortical white matter, which are nonspecific, but may represent chronic microvascular ischemic change. There is mild global parenchymal volume loss. Otherwise, the ventricles and sulci are normal in size and configuration. The sellar/suprasellar regions appear unremarkable. The normal signal voids within the major intracranial vessels appear maintained. ORBITS: The visualized portion of the orbits demonstrate no acute abnormality. SINUSES: The visualized paranasal sinuses and mastoid air cells demonstrate no acute abnormality. BONES/SOFT TISSUES: The bone marrow signal intensity appears normal. The soft tissues demonstrate no acute abnormality. 1. No acute intracranial abnormality. No acute infarct. 2. There is a chronic infarct involving the left cerebellum with chronic lacunar infarcts involving both thalami and left josselin.  3. Mild global parenchymal volume loss with mild-to-moderate chronic microvascular ischemic changes. Treatments: As above. Discharge Medications:     Medication List        START taking these medications      aspirin 81 MG EC tablet  Take 1 tablet by mouth daily     atorvastatin 10 MG tablet  Commonly known as: LIPITOR  Take 1 tablet by mouth nightly     guaiFENesin 100 MG/5ML liquid  Commonly known as: ROBITUSSIN  Take 5 mLs by mouth 4 times daily as needed for Cough            CONTINUE taking these medications      linagliptin 5 MG tablet  Commonly known as: TRADJENTA     losartan 50 MG tablet  Commonly known as: COZAAR     vitamin C 500 MG tablet  Commonly known as: ASCORBIC ACID     Xigduo XR 5-1000 MG Tb24  Generic drug: Dapagliflozin-metFORMIN HCl ER            STOP taking these medications      azithromycin 250 MG tablet  Commonly known as: Iggy Constant               Where to Get Your Medications        These medications were sent to [address] [place] [address] & [place] [id number] [facility], [address] [place] [address]      Phone: [phone]   aspirin 81 MG EC tablet  atorvastatin 10 MG tablet  guaiFENesin 100 MG/5ML liquid         Activity: activity as tolerated  Diet: ADULT DIET; Regular; 3 carb choices (45 gm/meal); Low Fat/Low Chol/High Fiber/2 gm Na      Disposition: home  Discharged Condition: Stable  Follow Up:   follow up with PCP as outpatient. Schedule an appointment as soon as possible for a visit in 1 week(s)        Code status:  Full Code     Total time spent on discharge, finalizing medications, referrals and arranging outpatient follow up was more than 20 minutes      Thank you Dr. Goode primary care provider on file. for the opportunity to be involved in this patients care.

## 2023-03-16 NOTE — DISCHARGE SUMMARY
Pt being discharged to home with wife. All new medications explained. Spouse stated she already picked meds up from [facility]. No further questions.

## 2023-03-16 NOTE — CARE COORDINATION
Case Management Assessment  Initial Evaluation    Date/Time of Evaluation: 3/16/2023 3:12 PM  Assessment Completed by: Abena Gamino RN    If patient is discharged prior to next notation, then this note serves as note for discharge by case management. Patient Name: Cheyenne Dowell                   YOB: 1945  Diagnosis: Dizziness [R42]  Postural instability of trunk [R68.89]  Stroke-like symptoms [R29.90]  Acute CVA (cerebrovascular accident) ([facility] [address] [I63.9]  Altered mental status, unspecified altered mental status type [R41.82]                   Date / Time: 3/14/2023  8:16 PM    Patient Admission Status: Inpatient   Readmission Risk (Low < 19, Mod (19-27), High > 27): Readmission Risk Score: 15.5    Current PCP: No primary care provider on file. PCP verified by CM? No (unknown)    Chart Reviewed: Yes      History Provided by: chart review  Patient Orientation: Alert and Oriented    Patient Cognition: Alert    Hospitalization in the last 30 days (Readmission):  No    If yes, Readmission Assessment in CM Navigator will be completed. Advance Directives:      Code Status: Full Code   Patient's Primary Decision Maker is: Legal Next of Kin      Discharge Planning:    Patient lives with: Spouse/Significant Other Type of Home: House  Primary Care Giver: Self  Patient Support Systems include: Spouse/Significant Other, Family Members   Current Financial resources: None  Current community resources: None  Current services prior to admission: None            Current DME: Emily Lam            Type of Home Care services:  None    ADLS  Prior functional level: Independent in ADLs/IADLs, Assistance with the following:, Housework, Cooking (family completes homemaking duties)  Current functional level: Independent in ADLs/IADLs, Assistance with the following:, Cooking, Housework    PT AM-PAC: 20 /24  OT AM-PAC: 21 /24    Family can provide assistance at DC:  Yes  Would you like Case Management to discuss the discharge plan with any other family members/significant others, and if so, who? Yes (wife)  Plans to Return to Present Housing: Yes  Other Identified Issues/Barriers to RETURNING to current housing: no barriers  Potential Assistance needed at discharge: N/A            Potential DME:    Patient expects to discharge to: House  Plan for transportation at discharge: Family    Financial    Payor: MEDICARE / Plan: MEDICARE PART A AND B / Product Type: *No Product type* /     Does insurance require precert for SNF: No    Potential assistance Purchasing Medications: No  Meds-to-Beds request: Yes      [id number] - [place], [place] - [address] - P [phone] - F [phone]  [address] [facility] [address]  Phone: [phone] Fax: [phone]      Notes:    Factors facilitating achievement of predicted outcomes: Family support    Barriers to discharge: None    Additional Case Management Notes: Patient will return home with family today. No discharge needs.    The Plan for Transition of Care is related to the following treatment goals of Dizziness [R42]  Postural instability of trunk [R68.89]  Stroke-like symptoms [R29.90]  Acute CVA (cerebrovascular accident) (HCC) [I63.9]  Altered mental status, unspecified altered mental status type [R41.82]    Kaity Vick RN  Case Management Department  Ph: [phone]

## 2023-03-20 LAB
GLUCOSE BLD-MCNC: 247 MG/DL (ref 70–99)
PERFORMED ON: ABNORMAL

## 2023-04-12 ENCOUNTER — HOSPITAL ENCOUNTER (EMERGENCY)
Age: 78
Discharge: HOME | End: 2023-04-12
Attending: EMERGENCY MEDICINE
Payer: MEDICARE

## 2023-04-12 VITALS
WEIGHT: 141.54 LBS | DIASTOLIC BLOOD PRESSURE: 89 MMHG | HEART RATE: 67 BPM | SYSTOLIC BLOOD PRESSURE: 173 MMHG | OXYGEN SATURATION: 99 % | BODY MASS INDEX: 20.96 KG/M2 | RESPIRATION RATE: 26 BRPM | HEIGHT: 69 IN | TEMPERATURE: 99 F

## 2023-04-12 DIAGNOSIS — G89.29 CHRONIC BILATERAL LOW BACK PAIN WITHOUT SCIATICA: ICD-10-CM

## 2023-04-12 DIAGNOSIS — R29.898 LEG WEAKNESS, BILATERAL: Primary | ICD-10-CM

## 2023-04-12 DIAGNOSIS — M54.50 CHRONIC BILATERAL LOW BACK PAIN WITHOUT SCIATICA: ICD-10-CM

## 2023-04-12 DIAGNOSIS — M79.604 BILATERAL LEG PAIN: ICD-10-CM

## 2023-04-12 DIAGNOSIS — M79.605 BILATERAL LEG PAIN: ICD-10-CM

## 2023-04-12 LAB
ALBUMIN SERPL-MCNC: 3.8 G/DL (ref 3.4–5)
ALBUMIN/GLOB SERPL: 1.3 {RATIO} (ref 1.1–2.2)
ALP SERPL-CCNC: 66 U/L (ref 40–129)
ALT SERPL-CCNC: 13 U/L (ref 10–40)
ANION GAP SERPL CALCULATED.3IONS-SCNC: 14 MMOL/L (ref 3–16)
AST SERPL-CCNC: 16 U/L (ref 15–37)
BASOPHILS # BLD: 0 K/UL (ref 0–0.2)
BASOPHILS NFR BLD: 0.8 %
BILIRUB SERPL-MCNC: 0.5 MG/DL (ref 0–1)
BILIRUB UR QL STRIP.AUTO: NEGATIVE
BUN SERPL-MCNC: 16 MG/DL (ref 7–20)
CALCIUM SERPL-MCNC: 9.4 MG/DL (ref 8.3–10.6)
CHLORIDE SERPL-SCNC: 102 MMOL/L (ref 99–110)
CK SERPL-CCNC: 34 U/L (ref 39–308)
CLARITY UR: CLEAR
CO2 SERPL-SCNC: 23 MMOL/L (ref 21–32)
COLOR UR: ABNORMAL
CREAT SERPL-MCNC: 0.8 MG/DL (ref 0.8–1.3)
DEPRECATED RDW RBC AUTO: 15.3 % (ref 12.4–15.4)
EOSINOPHIL # BLD: 0.1 K/UL (ref 0–0.6)
EOSINOPHIL NFR BLD: 1.8 %
GFR SERPLBLD CREATININE-BSD FMLA CKD-EPI: >60 ML/MIN/{1.73_M2}
GLUCOSE SERPL-MCNC: 234 MG/DL (ref 70–99)
GLUCOSE UR STRIP.AUTO-MCNC: >=1000 MG/DL
HCT VFR BLD AUTO: 39.2 % (ref 40.5–52.5)
HGB BLD-MCNC: 12.5 G/DL (ref 13.5–17.5)
HGB UR QL STRIP.AUTO: NEGATIVE
KETONES UR STRIP.AUTO-MCNC: 15 MG/DL
LEUKOCYTE ESTERASE UR QL STRIP.AUTO: NEGATIVE
LYMPHOCYTES # BLD: 1.2 K/UL (ref 1–5.1)
LYMPHOCYTES NFR BLD: 24.1 %
MCH RBC QN AUTO: 29.7 PG (ref 26–34)
MCHC RBC AUTO-ENTMCNC: 31.9 G/DL (ref 31–36)
MCV RBC AUTO: 93.1 FL (ref 80–100)
MONOCYTES # BLD: 0.4 K/UL (ref 0–1.3)
MONOCYTES NFR BLD: 8.4 %
NEUTROPHILS # BLD: 3.2 K/UL (ref 1.7–7.7)
NEUTROPHILS NFR BLD: 64.9 %
NITRITE UR QL STRIP.AUTO: NEGATIVE
PH UR STRIP.AUTO: 5 [PH] (ref 5–8)
PLATELET # BLD AUTO: 183 K/UL (ref 135–450)
PMV BLD AUTO: 8.4 FL (ref 5–10.5)
POTASSIUM SERPL-SCNC: 4.2 MMOL/L (ref 3.5–5.1)
PROT SERPL-MCNC: 6.8 G/DL (ref 6.4–8.2)
PROT UR STRIP.AUTO-MCNC: NEGATIVE MG/DL
RBC # BLD AUTO: 4.21 M/UL (ref 4.2–5.9)
SODIUM SERPL-SCNC: 139 MMOL/L (ref 136–145)
SP GR UR STRIP.AUTO: 1.04 (ref 1–1.03)
UA COMPLETE W REFLEX CULTURE PNL UR: ABNORMAL
UA DIPSTICK W REFLEX MICRO PNL UR: ABNORMAL
URN SPEC COLLECT METH UR: ABNORMAL
UROBILINOGEN UR STRIP-ACNC: 0.2 E.U./DL
WBC # BLD AUTO: 4.9 K/UL (ref 4–11)

## 2023-04-12 PROCEDURE — 85025 COMPLETE CBC W/AUTO DIFF WBC: CPT

## 2023-04-12 PROCEDURE — 80053 COMPREHEN METABOLIC PANEL: CPT

## 2023-04-12 PROCEDURE — 82550 ASSAY OF CK (CPK): CPT

## 2023-04-12 PROCEDURE — 99284 EMERGENCY DEPT VISIT MOD MDM: CPT

## 2023-04-12 PROCEDURE — 81003 URINALYSIS AUTO W/O SCOPE: CPT

## 2023-04-12 ASSESSMENT — PAIN SCALES - GENERAL: PAINLEVEL_OUTOF10: 10

## 2023-04-12 ASSESSMENT — PAIN - FUNCTIONAL ASSESSMENT: PAIN_FUNCTIONAL_ASSESSMENT: 0-10

## 2023-04-12 ASSESSMENT — LIFESTYLE VARIABLES
HOW MANY STANDARD DRINKS CONTAINING ALCOHOL DO YOU HAVE ON A TYPICAL DAY: 1 OR 2
HOW OFTEN DO YOU HAVE A DRINK CONTAINING ALCOHOL: 2-4 TIMES A MONTH

## 2023-04-12 ASSESSMENT — PAIN DESCRIPTION - LOCATION: LOCATION: LEG

## 2023-04-12 NOTE — ED NOTES
Discharge and education instructions reviewed. Patient verbalized understanding, teach-back successful. Patient denied questions at this time. No acute distress noted. Patient instructed to follow-up as noted - return to emergency department if symptoms worsen. Patient verbalized understanding. Discharged per EDMD with discharged instructions.        Rajesh Downing RN  04/12/23 0723

## 2023-04-12 NOTE — ED PROVIDER NOTES
[address] [facility]        Pt Name: Veverly Boeck  MRN: [id number]  [place] 1945  Date of evaluation: 4/12/2023  Provider: Arlene Bone MD  PCP: No primary care provider on file. Note Started: 4:23 PM EDT 4/12/23    ED Attending Attestation Note     CHIEF COMPLAINT     Leg pain  HISTORY OF PRESENT ILLNESS  (Location/Symptom, Timing/Onset,Context/Setting, Quality, Duration, Modifying Factors, Severity). Note limiting factors. This patient was seen by the advance practice provider. I have seen and examined the patient, agree with the workup, evaluation, management and diagnosis. The care plan has been discussed. History from : Patient  Limitations to history : Language (ESL)    Chief Complaint   Patient presents with    Leg Pain     Pt came in due to having leg pain in both legs for the past few months but has gotten worse. He rates it as a 10/10 and difficulty walking        Veverly Boeck is a 78 y.o. male who presents to the emergency department secondary to concern for leg pain, both legs, ongoing since two weeks. Endorses tingling in his feet that is new. Endorses urinary incontinence for 2-3 months. States the pain is so much he cannot walk and people carry him places. Though noted he walked to the restroom here twice without assistance. Does not know medications he takes. On my evaluation the patient and I spoke in [address] which is his primary language and his wife was also present for collateral. The symptoms he has had started after hospitalization and are not pain so much as weakness. The weakness occurs after he has been walking. He has however been walking more every day and they do think it is getting a little bit better. The wife states they used a wheelchair for him when they went out shopping because he got tired.  He was recommended for therapy after his most recent hospital visit which he declined at

## 2023-04-12 NOTE — ED PROVIDER NOTES
[facility]        Pt Name: Helio Rankin  MRN: [id number]  [place] 1945  Date of evaluation: 4/12/2023  Provider: ERLINDA Sharp  PCP: No primary care provider on file. Note Started: 4:28 PM EDT 4/12/23       I have seen and evaluated this patient with my supervising physician Carol Gonzalez MD.      [address] [place] [address]       Chief Complaint   Patient presents with    Leg Pain     Pt came in due to having leg pain in both legs for the past few months but has gotten worse. He rates it as a 10/10 and difficulty walking       HISTORY OF PRESENT ILLNESS: 1 or more Elements     History From: patient through [facility]  #[id number] and patient's wife who speaks English    Helio Rankin is a 78 y.o. male who presents with bilateral leg pain for the past 2 weeks. Wife reports he has generalized weakness. Reports it started after admission last month for COVID. Reports he was told at discharge he may need physical therapy but has not had any yet. Wife reports generalized weakness. Reports recently they are at a store and she had to get a wheelchair for him due to generalized weakness. Patient reports tingling in the bottom of his feet but that has been present for years. He has diabetes. He has had urinary incontinence for a year that seems to be slowly worsening. No bowel incontinence. No saddle anesthesia. No urinary retention. No recent spinal injections or surgical procedures. No recent injuries to his back or legs. No back pain. Takes aspirin 81 mg. No drug use. No fevers chills chest pain shortness of breath nausea vomiting diarrhea abdominal pain     Nursing Notes were reviewed and agreed with or any disagreements were addressed in the HPI. REVIEW OF SYSTEMS :      Review of Systems    Positives and Pertinent negatives as per HPI.      SURGICAL HISTORY     Past Surgical History:

## 2023-04-18 ENCOUNTER — HOSPITAL ENCOUNTER (OUTPATIENT)
Dept: PHYSICAL THERAPY | Age: 78
Setting detail: THERAPIES SERIES
Discharge: HOME | End: 2023-04-18
Payer: OTHER GOVERNMENT

## 2023-04-18 ENCOUNTER — HOSPITAL ENCOUNTER (OUTPATIENT)
Dept: OCCUPATIONAL THERAPY | Age: 78
Setting detail: THERAPIES SERIES
Discharge: HOME | End: 2023-04-18
Payer: OTHER GOVERNMENT

## 2023-04-18 PROCEDURE — 97110 THERAPEUTIC EXERCISES: CPT

## 2023-04-18 PROCEDURE — 97162 PT EVAL MOD COMPLEX 30 MIN: CPT

## 2023-04-18 ASSESSMENT — PAIN SCALES - GENERAL: PAINLEVEL_OUTOF10: 9

## 2023-04-18 ASSESSMENT — PAIN DESCRIPTION - LOCATION: LOCATION: LEG;FOOT

## 2023-04-18 ASSESSMENT — PAIN DESCRIPTION - PAIN TYPE: TYPE: NEUROPATHIC PAIN

## 2023-04-18 NOTE — PROGRESS NOTES
may need physical therapy but has not had any yet. Wife reports generalized weakness. Reports recently they are at a store and she had to get a wheelchair for him due to generalized weakness. Patient reports tingling in the bottom of his feet but that has been present for years. He has diabetes. \" Melchor Odonnell 4/12/2023)     Prior diagnostic testing[de-identified] MRI, CT Scan  Previous treatments prior to current episode?: Outpatient PT ([facility]  Any changes to allergies, medications, or have you had any medical procedures/ER visits since your last visit?: No  Comment: Last fall was october of 2022       Learning/Language: Learning  Does the patient/guardian have any barriers to learning?: Language  Is an  required?: Yes  Is an  present?: Yes (video inturpreter Carlos Bowens)     Pain Screening    Pain Screening  Patient Currently in Pain: Yes  Pain Assessment: 0-10  Pain Level: 9  Pain Type: Neuropathic pain  Pain Location: Leg, Foot  Pain Orientation:  (Right > left but bilateral)  Pain Descriptors: Pins and needles, Aching, Burning    Functional Status    Dominant Hand: : Right    Social History:    Social History  Lives With: Spouse  Type of Home: House  Home Layout: One level  Home Access: Level entry  Home Equipment: [employer], Walker, rolling    Occupation/Interests:  Occupation: Retired    Prior Level of Function:    Independent   Grooming: Independent/No Functional Limitation  Bathing: Independent/No Functional Limitation  Upper Body Dressing: Independent/No Functional Limitation  Lower Body Dressing: Independent/No Functional Limitation  Toileting: Independent/No Functional Limitation  Self Care: Independent/No Functional Limitation  Bed Mobility: Independent/No Functional Limitation  Transfers (sit to stand):  Independent/No Functional Limitation  Walking: Independent/No Functional Limitation  Stairs: Independent/No Functional

## 2023-04-18 NOTE — PLAN OF CARE
the following: Current Treatment Recommendations: Strengthening, ROM, Balance training, Functional mobility training, Transfer training, Endurance training, Neuromuscular re-education, Stair training, Gait training, Safety education & training, Patient/Caregiver education & training, Equipment evaluation, education, & procurement, Positioning, Therapeutic activities, Home exercise program     Frequency / Duration:  Patient to be seen 2 X per week for 8 weeks weeks       Patient Status: [x] Continue / Initiate Plan of Care     Signature: Electronically signed by Miguel Brown PT on 4/18/2023 at 11:31 AM.   Saba Rebollar PT, DPT [id number] 04/18/23 11:32 AM    If you have any questions or concerns, please don't hesitate to call.   Thank you for your referral!

## 2023-04-20 ENCOUNTER — OFFICE VISIT (OUTPATIENT)
Dept: ORTHOPEDIC SURGERY | Age: 78
End: 2023-04-20
Payer: OTHER GOVERNMENT

## 2023-04-20 VITALS — HEIGHT: 69 IN | RESPIRATION RATE: 17 BRPM | BODY MASS INDEX: 20.88 KG/M2 | WEIGHT: 141 LBS

## 2023-04-20 DIAGNOSIS — M54.17 LUMBOSACRAL RADICULOPATHY AT S1: ICD-10-CM

## 2023-04-20 DIAGNOSIS — N39.42 URINARY INCONTINENCE WITHOUT SENSORY AWARENESS: ICD-10-CM

## 2023-04-20 DIAGNOSIS — M54.50 LOW BACK PAIN, UNSPECIFIED BACK PAIN LATERALITY, UNSPECIFIED CHRONICITY, UNSPECIFIED WHETHER SCIATICA PRESENT: Primary | ICD-10-CM

## 2023-04-20 PROCEDURE — 1123F ACP DISCUSS/DSCN MKR DOCD: CPT | Performed by: PHYSICIAN ASSISTANT

## 2023-04-20 PROCEDURE — 99204 OFFICE O/P NEW MOD 45 MIN: CPT | Performed by: PHYSICIAN ASSISTANT

## 2023-04-20 NOTE — PROGRESS NOTES
Patient does not speak English therefore a  was utilized. History of present illness:   Mr. Abraham Boston is a pleasant 78 y.o. male kindly referred by [facility] for consultation regarding his LBP and leg pain. His pain began around 3/15/23 while traveling from [facility] to [place] to visit family. Pain has steadily worsened since onset. Back pain 9/10 VAS, right and left buttock pain 7/10 VAS, right and left leg pain 10/10 VAS. Pain is describes as aching, throbbing. He reports numbness and tingling bilateral lower extremities. He reports weakness of his right and left leg. He reports urinary incontinence which has been going on for 1.5-2 years. He denies bowel dysfunction and saddle anesthesia. He can sit for a maximum of 30 minutes and stand for a maximum 10 minutes. Pain does disrupt his sleep. Prior medications and treatment tried include Tylenol. He is on gabapentin for his neuropathy    Past medical history:  His past medical history has been reviewed. Past Medical History:   Diagnosis Date    Diabetes mellitus ([facility] [address]     Hypertension        His past surgical history has been reviewed. Past Surgical History:   Procedure Laterality Date    EYE SURGERY           His medications and allergies were reviewed.   Current Outpatient Medications   Medication Sig Dispense Refill    aspirin 81 MG EC tablet Take 1 tablet by mouth daily 30 tablet 3    atorvastatin (LIPITOR) 10 MG tablet Take 1 tablet by mouth nightly 30 tablet 3    guaiFENesin (ROBITUSSIN) 100 MG/5ML liquid Take 5 mLs by mouth 4 times daily as needed for Cough 236 mL 0    Dapagliflozin-metFORMIN HCl ER (XIGDUO XR) 5-1000 MG TB24 Take 1 tablet by mouth daily      losartan (COZAAR) 50 MG tablet Take 50 mg by mouth daily      linagliptin (TRADJENTA) 5 MG tablet Take 5 mg by mouth daily      vitamin C (ASCORBIC ACID) 500 MG tablet Take 500 mg by mouth daily       No current facility-administered

## 2023-04-25 ENCOUNTER — HOSPITAL ENCOUNTER (OUTPATIENT)
Dept: PHYSICAL THERAPY | Age: 78
Setting detail: THERAPIES SERIES
Discharge: HOME | End: 2023-04-25
Payer: MEDICARE

## 2023-04-25 PROCEDURE — 97110 THERAPEUTIC EXERCISES: CPT

## 2023-04-25 PROCEDURE — 97112 NEUROMUSCULAR REEDUCATION: CPT

## 2023-04-25 ASSESSMENT — PAIN SCALES - GENERAL: PAINLEVEL_OUTOF10: 8

## 2023-04-25 NOTE — PROGRESS NOTES
Physical Therapy: Daily Note   Patient: Morena Ball (8 y.o. male)   Examination Date: 2023  No data recorded  No data recorded   :  1945 # of Visits since [facility]:   2   MRN: [id number]  CSN: [id number] Start of Care Date:   2023   Insurance: Payor:  / Plan:  FOR LIFE MEDICARE SUPP / Product Type: *No Product type* /   Insurance ID: [id number] - (Medicare) Secondary Insurance (if applicable):    Referring Physician: Melchor Quijano PA   PCP: No primary care provider on file. Visits to Date/Visits Approved:     No Show/Cancelled Appts: 0 / 0     Medical Diagnosis: Bilateral leg pain [M79.604, M79.605] Bilateral leg pain (M79.604, M79.605)  Treatment Diagnosis: Decreased functional mobility capacity with decreased strength due to increased pain. SUBJECTIVE EXAMINATION   Pain Level: Pain Screening  Patient Currently in Pain: Yes  Pain Assessment: 0-10  Pain Level: 8    Patient Comments: Subjective: SOme difficulty doing exercises due to stiffness but overall tolerating well, no new complaints continued pain with bilateral feet. Agreeable to PT treatment session.     HEP Compliance: Good  Previous treatments prior to current episode?: Outpatient PT (private office outpatient earlier this year, acute hospital)  Any changes to allergies, medications, or have you had any medical procedures/ER visits since your last visit?: No  Comment: Last fall was 2022     OBJECTIVE EXAMINATION   Restrictions:  Restrictions/Precautions: Fall Risk   No data recorded No data recorded              TREATMENT     Exercises:  Therapeutic exercise (CPT 15523)   Treatment Reasoning    Exercise 1: Standing X 15 BLEs at bar for support including B heel raises, hip abduction knee straight alternating and marching alternating all standing on blue aired  Exercise 2: Standing mini squats and hip extension alternating each with knee  X 15 cues for body

## 2023-04-27 ENCOUNTER — HOSPITAL ENCOUNTER (OUTPATIENT)
Dept: PHYSICAL THERAPY | Age: 78
Setting detail: THERAPIES SERIES
Discharge: HOME | End: 2023-04-27
Payer: MEDICARE

## 2023-04-27 PROCEDURE — 9990000010 HC NO CHARGE VISIT

## 2023-04-27 NOTE — PROGRESS NOTES
Physical Therapy  Cancellation/No-show Note  Patient Name:  Ligia Arana  :  1945   Date:  2023  Cancelled visits to date: 0  No-shows to date: 1    For today's appointment patient:  []  Cancelled  []  Rescheduled appointment  [x]  No-show     Reason given by patient:  []  Patient ill  []  Conflicting appointment  []  No transportation    []  Conflict with work  [x]  No reason given  [x]  Other:     Comments:  NO call, attempted calling patient and without answer at this time.      Electronically signed by:  Tiana Crigler, [address] [place] [address], DPT [id number] 23 10:17 AM

## 2023-05-02 ENCOUNTER — HOSPITAL ENCOUNTER (OUTPATIENT)
Dept: PHYSICAL THERAPY | Age: 78
Setting detail: THERAPIES SERIES
Discharge: HOME | End: 2023-05-02
Payer: MEDICARE

## 2023-05-02 PROCEDURE — 97110 THERAPEUTIC EXERCISES: CPT

## 2023-05-02 PROCEDURE — 97112 NEUROMUSCULAR REEDUCATION: CPT

## 2023-05-02 ASSESSMENT — PAIN SCALES - GENERAL: PAINLEVEL_OUTOF10: 7

## 2023-05-02 NOTE — PROGRESS NOTES
Physical Therapy: Daily Note   Patient: Aileen Gray (55 y.o. male)   Examination Date: 2023  No data recorded  No data recorded   :  1945 # of Visits since [facility]:   3   MRN: [id number]  CSN: [id number] Start of Care Date:   2023   Insurance: Payor: GENERIC MANAGED MEDICARE / Plan: Ganos / Product Type: *No Product type* /   Insurance ID: [id number] - (Other) Secondary Insurance (if applicable):    Referring Physician: Melchor Roland PA   PCP: No primary care provider on file. Visits to Date/Visits Approved: 3 / 30    No Show/Cancelled Appts:      Medical Diagnosis: Bilateral leg pain [M79.604, M79.605] Bilateral leg pain (M79.604, M79.605)  Treatment Diagnosis: Decreased functional mobility capacity with decreased strength due to increased pain. SUBJECTIVE EXAMINATION   Pain Level: Pain Screening  Patient Currently in Pain: Yes  Pain Assessment: 0-10  Pain Level: 7  Worst Pain Level: 8    Patient Comments: Subjective: SPouse reports goal is 2 additional weeks of therapy with plan to travel home May 13th with last day on therapy .  no new complaints continued pain with bilateral feet. Agreeable to PT treatment session.     HEP Compliance: Good  Previous treatments prior to current episode?: Outpatient PT (private office outpatient earlier this year, [facility] hospital)  Any changes to allergies, medications, or have you had any medical procedures/ER visits since your last visit?: No  Comment: Last fall was 2022     OBJECTIVE EXAMINATION   Restrictions:  Restrictions/Precautions: Fall Risk   No data recorded No data recorded              TREATMENT     Exercises:  Therapeutic exercise (CPT 87551)   Treatment Reasoning    Exercise 1: Standing X 15 BLEs at bar for support including B heel raises, hip abduction knee straight alternating and marching alternating all standing on blue airex pad  Exercise 2: Standing

## 2023-05-04 ENCOUNTER — HOSPITAL ENCOUNTER (OUTPATIENT)
Dept: PHYSICAL THERAPY | Age: 78
Setting detail: THERAPIES SERIES
Discharge: HOME | End: 2023-05-04
Payer: MEDICARE

## 2023-05-04 PROCEDURE — 97110 THERAPEUTIC EXERCISES: CPT

## 2023-05-04 PROCEDURE — 97112 NEUROMUSCULAR REEDUCATION: CPT

## 2023-05-04 ASSESSMENT — PAIN SCALES - GENERAL: PAINLEVEL_OUTOF10: 7

## 2023-05-04 NOTE — PROGRESS NOTES
Physical Therapy: Daily Note   Patient: Aileen Gray (65 y.o. male)   Examination Date: 2023  No data recorded  No data recorded   :  1945 # of Visits since [facility]:   4   MRN: [id number]  CSN: [id number] Start of Care Date:   2023   Insurance: Payor: GENERIC MANAGED MEDICARE / Plan: 9Flava / Product Type: *No Product type* /   Insurance ID: [id number] - (Other) Secondary Insurance (if applicable):    Referring Physician: Melchor Roland PA   PCP: No primary care provider on file. Visits to Date/Visits Approved:     No Show/Cancelled Appts:      Medical Diagnosis: Bilateral leg pain [M79.604, M79.605] Bilateral leg pain (M79.604, M79.605)  Treatment Diagnosis: Decreased functional mobility capacity with decreased strength due to increased pain. SUBJECTIVE EXAMINATION   Pain Level: Pain Screening  Patient Currently in Pain: Yes  Pain Assessment: 0-10  Pain Level: 7    Patient Comments: Subjective: Pt reports feeling good, better today. Pt agreeable to PT treatment session, no new complaints.     HEP Compliance: Good  Previous treatments prior to current episode?: Outpatient PT (private office outpatient earlier this year, acute hospital)  Any changes to allergies, medications, or have you had any medical procedures/ER visits since your last visit?: No  Comment: Last fall was 2022     OBJECTIVE EXAMINATION   Restrictions:  Restrictions/Precautions: Fall Risk   No data recorded No data recorded              TREATMENT     Exercises:  Therapeutic exercise (CPT 03377)   Treatment Reasoning    Exercise 1: Standing X 15 BLEs at bar for support including B heel raises, hip abduction knee straight alternating and marching alternating all standing on blue airex pad  Exercise 2: Standing mini squats and hip extension alternating each with knee  X 15 cues for body positioning/proper sequencing throughout  Exercise 3:

## 2023-05-08 ENCOUNTER — HOSPITAL ENCOUNTER (OUTPATIENT)
Dept: MRI IMAGING | Age: 78
Discharge: HOME | End: 2023-05-08
Payer: MEDICARE

## 2023-05-08 DIAGNOSIS — N39.42 URINARY INCONTINENCE WITHOUT SENSORY AWARENESS: ICD-10-CM

## 2023-05-08 DIAGNOSIS — M54.17 LUMBOSACRAL RADICULOPATHY AT S1: ICD-10-CM

## 2023-05-08 PROCEDURE — 72148 MRI LUMBAR SPINE W/O DYE: CPT

## 2023-05-09 ENCOUNTER — HOSPITAL ENCOUNTER (OUTPATIENT)
Dept: PHYSICAL THERAPY | Age: 78
Setting detail: THERAPIES SERIES
Discharge: HOME | End: 2023-05-09
Payer: OTHER GOVERNMENT

## 2023-05-09 PROCEDURE — 97112 NEUROMUSCULAR REEDUCATION: CPT

## 2023-05-09 PROCEDURE — 97110 THERAPEUTIC EXERCISES: CPT

## 2023-05-09 ASSESSMENT — PAIN SCALES - GENERAL: PAINLEVEL_OUTOF10: 5

## 2023-05-09 NOTE — PROGRESS NOTES
Physical Therapy: Daily Note   Patient: Anh Julio (34 y.o. male)   Examination Date: 2023  No data recorded  No data recorded   :  1945 # of Visits since [facility]:   5   MRN: [id number]  CSN: [id number] Start of Care Date:   2023   Insurance: Payor: Cleo Wilkinson / Plan: Cleo Wilkinson / Product Type: *No Product type* /   Insurance ID: [id number] - (Other) Secondary Insurance (if applicable):    Referring Physician: Melchor Stafford PA   PCP: No primary care provider on file. Visits to Date/Visits Approved:     No Show/Cancelled Appts:      Medical Diagnosis: Bilateral leg pain [M79.604, M79.605] Bilateral leg pain (M79.604, M79.605)  Treatment Diagnosis: Decreased functional mobility capacity with decreased strength due to increased pain. SUBJECTIVE EXAMINATION   Pain Level: Pain Screening  Patient Currently in Pain: Yes  Pain Assessment: 0-10  Pain Level: 5    Patient Comments: Subjective: Pt reports feeling good, better today pain improving every day. Pt agreeable to PT treatment session, no new complaints.     HEP Compliance: Good  Previous treatments prior to current episode?: Outpatient PT (private office outpatient earlier this year, acute hospital)  Any changes to allergies, medications, or have you had any medical procedures/ER visits since your last visit?: No  Comment: Last fall was 2022     OBJECTIVE EXAMINATION   Restrictions:  Restrictions/Precautions: Fall Risk   No data recorded No data recorded              TREATMENT     Exercises:  Therapeutic exercise (CPT 45328)   Treatment Reasoning    Exercise 1: Standing X 15 BLEs at bar for support including B heel raises, hip abduction knee straight alternating, mini squats, and marching alternating all standing on blue airex pad  Exercise 2: Standing hip extension alternating each with knee  X 15 cues for body positioning/proper sequencing

## 2023-05-11 ENCOUNTER — HOSPITAL ENCOUNTER (OUTPATIENT)
Dept: PHYSICAL THERAPY | Age: 78
Setting detail: THERAPIES SERIES
Discharge: HOME | End: 2023-05-11
Payer: OTHER GOVERNMENT

## 2023-05-11 ENCOUNTER — OFFICE VISIT (OUTPATIENT)
Dept: ORTHOPEDIC SURGERY | Age: 78
End: 2023-05-11
Payer: OTHER GOVERNMENT

## 2023-05-11 VITALS — BODY MASS INDEX: 20.88 KG/M2 | HEIGHT: 69 IN | WEIGHT: 141 LBS

## 2023-05-11 DIAGNOSIS — M54.17 LUMBOSACRAL RADICULOPATHY AT S1: Primary | ICD-10-CM

## 2023-05-11 PROCEDURE — 97110 THERAPEUTIC EXERCISES: CPT

## 2023-05-11 PROCEDURE — 99213 OFFICE O/P EST LOW 20 MIN: CPT | Performed by: PHYSICIAN ASSISTANT

## 2023-05-11 PROCEDURE — 97164 PT RE-EVAL EST PLAN CARE: CPT

## 2023-05-11 PROCEDURE — 1123F ACP DISCUSS/DSCN MKR DOCD: CPT | Performed by: PHYSICIAN ASSISTANT

## 2023-05-11 ASSESSMENT — PAIN SCALES - GENERAL: PAINLEVEL_OUTOF10: 5

## 2023-05-11 NOTE — CONSULTS
Session ID: [id number]  Request SO:[id number]  Language:Greek  Status:Fulfilled  Agent LP:#[id number]  Agent Name:Hamzah Patient was admitted with a small bowel obstruction and treated conservatively with NGT decompression and bowel rest.  Their diet was then slowly advanced as tolerated. Once patient was tolerating regular diet, voiding and ambulating without difficulty, they were found to be stable for discharge to home.

## 2023-05-11 NOTE — PLAN OF CARE
FEEDING:  Milk - 2%                       Diet - good variety of foods  SLEEPIN hours at night  URINATION:  no enuresis or daytime incontinence    STOOLS:  normal  SCHOOL HISTORY:       [employer] - [employer]       Grade - 5       Academic performance - normal       Extracurricular Activities - [employer]    CONCERNS:  none  Denies known Latex allergy or symptoms of Latex sensitivity.  meds reviewed.      [facility]  [address] [address]  Dept: [phone]  Loc: [phone]    PHYSICAL THERAPY PLAN OF CARE: DISCHARGE    Patient: Sukumar Katz (85 y.o. male)   Examination Date:   Plan of Care Certification Period: 2023 to        :  1945  MRN: [id number]  CSN: [id number]   Insurance: Payor: Britney Pester / Plan: Britney Pester / Product Type: *No Product type* /   Insurance ID: [id number] - (Other) Secondary Insurance (if applicable):    Referring Physician: Melchor Pandya PA   PCP: No primary care provider on file. Visits to Date/Visits Approved:     No Show/Cancelled Appts:      Medical Diagnosis: Bilateral leg pain [M79.604, M79.605] Bilateral leg pain (M79.604, M79.605)  Treatment Diagnosis: Decreased functional mobility capacity with decreased strength due to increased pain. Physical Therapy Re-Certification Plan of Care/MD UPDATE      Dear Arik Philip, *,    We had the pleasure of treating the following patient for physical therapy services at [facility]. A summary of our findings can be found in the updated assessment below. This includes our plan of care. If you have any questions or concerns regarding these findings, please do not hesitate to contact me at the office phone number checked above.   Thank you for the referral.     Physician Signature:________________________________Date:__________________  By signing above (or electronic signature), therapists plan is approved by physician    Date Range Of Visits: 2023  Total Visits to Date: 6  Overall Response to Treatment:   [x]Patient is responding well to treatment and improvement is noted with regards  to goals   [x]Patient should continue to improve in reasonable time if they continue HEP   []Patient has plateaued and is no longer

## 2023-05-11 NOTE — PROGRESS NOTES
overall. Gains in both SINHA balance test and SIS progressing well. Pt to return home at this time, discharge from outpatient PT. Body Structures, Functions, Activity Limitations Requiring Skilled Therapeutic Intervention: Decreased functional mobility , Decreased endurance, Decreased balance, Decreased strength, Decreased body mechanics, Decreased posture, Decreased coordination, Increased pain    Post-Treatment Pain Level: Activity Tolerance: Patient tolerated evaluation without incident; Patient tolerated treatment well    Therapy Prognosis: Good       GOALS   Patient Goals : \"for my legs and back to get better. regain my strength because I am weak\"  Short Term Goals Completed by 4 weeks Current Status Goal Status   Improved Sinha Balance score 45/56 improving stability with higher level balance tasks to promote decreased risk for falls. Met   Improve BLEs strength gneralized 4/5 to promote increased functional mobility capacity   Met   Improved functional mobility within home and community improving stroke impact scale 30/45   Met   Demonstrate good participation with HEP with minimal cues 25% during participation with skilled PT intervention   Met (Pt met 4/4 STG)                                                       Long Term Goals Completed by 8 weeks Current Status Goal Status   Improve Sinha balance test 54/56 demonstrating decreased risk for falls improving functional capacity. Met   Improved bilateral strength general 4+/5 BLEs promoting improved functional capacity for all functional mobility tasks   Met   Improved Stroke impact scale 40/45 demonstrating improved ability to perform household and community functional mobility tasks.    Not Met   [place] 100% of time with HEP demonstrating improved capacity in perform of all exercises   Met (Pt met 3/4 LTG)                                                        TREATMENT PLAN   PT Equipment Recommendations  Equipment Needed: No  Plan Frequency:

## 2023-05-11 NOTE — PROGRESS NOTES
stenosis or  neural foraminal narrowing. L2-L3: There is no significant disc herniation, spinal canal stenosis or  neural foraminal narrowing. L3-L4: There is no significant disc herniation, spinal canal stenosis or  neural foraminal narrowing. L4-L5: There is no significant disc herniation, spinal canal stenosis or  neural foraminal narrowing. L5-S1: Central disc extrusion. No spinal canal narrowing. Mild to moderate  left neural foraminal narrowing. IMPRESSION:  No significant spinal canal narrowing. Mild-to-moderate left neural  foraminal narrowing at L5-S1. Diagnosis:       ICD-10-CM    1. Lumbosacral radiculopathy at S1  M54.17            Assessment and Plan:       Assessment:  Mild left foraminal narrowing at L5-S1 no significant foraminal narrowing on the right. No canal stenosis. Mild left foraminal narrowing at L5-S1 could cause some of his left leg radicular pain but nothing to account for his right leg pain. I had an extensive discussion with Mr. Re Morales regarding the natural history, etiology, and long term consequences of his condition. I have presented reasonable alternatives to the patient's proposed care, treatment, and services. Risks and benefits of the treatment options also reviewed in detail. I have outlined a treatment plan with them. He has had full opportunity to ask his questions. I have answered them all to his satisfaction. I feel that Mr. Wesly Francisco understands our discussion today. Plan:    He is traveling back to [facility] later this week. He will follow-up with his primary care physician when he gets home. Follow up-    Call or return to clinic if these symptoms worsen or fail to improve as anticipated. Ruth Phillips PA-C   Senior Physician Assistant   [facility]                                         Disclaimer:   This note was

## 2023-05-16 ENCOUNTER — APPOINTMENT (OUTPATIENT)
Dept: PHYSICAL THERAPY | Age: 78
End: 2023-05-16
Payer: MEDICARE

## 2023-05-18 ENCOUNTER — APPOINTMENT (OUTPATIENT)
Dept: PHYSICAL THERAPY | Age: 78
End: 2023-05-18
Payer: MEDICARE

## 2023-12-02 ENCOUNTER — APPOINTMENT (OUTPATIENT)
Dept: CT IMAGING | Age: 78
DRG: 066 | End: 2023-12-02
Payer: MEDICARE

## 2023-12-02 ENCOUNTER — HOSPITAL ENCOUNTER (INPATIENT)
Age: 78
LOS: 1 days | Discharge: HOME | DRG: 066 | End: 2023-12-04
Attending: EMERGENCY MEDICINE | Admitting: STUDENT IN AN ORGANIZED HEALTH CARE EDUCATION/TRAINING PROGRAM
Payer: MEDICARE

## 2023-12-02 DIAGNOSIS — E11.319 TYPE 2 DIABETES MELLITUS WITH RETINOPATHY OF BOTH EYES, MACULAR EDEMA PRESENCE UNSPECIFIED, UNSPECIFIED RETINOPATHY SEVERITY, UNSPECIFIED WHETHER LONG TERM INSULIN USE (HCC): ICD-10-CM

## 2023-12-02 DIAGNOSIS — I67.2 INTRACRANIAL ATHEROSCLEROSIS: ICD-10-CM

## 2023-12-02 DIAGNOSIS — R29.90 STROKE-LIKE SYMPTOMS: Primary | ICD-10-CM

## 2023-12-02 PROBLEM — E11.9 TYPE 2 DIABETES MELLITUS (HCC): Status: ACTIVE | Noted: 2023-12-02

## 2023-12-02 PROBLEM — R42 DIZZINESS AND GIDDINESS: Status: RESOLVED | Noted: 2023-12-02 | Resolved: 2023-12-02

## 2023-12-02 PROBLEM — R29.898 LEG WEAKNESS, BILATERAL: Status: ACTIVE | Noted: 2023-12-02

## 2023-12-02 PROBLEM — R26.89 IMBALANCE: Status: ACTIVE | Noted: 2023-12-02

## 2023-12-02 PROBLEM — R42 DIZZINESS AND GIDDINESS: Status: ACTIVE | Noted: 2023-12-02

## 2023-12-02 PROBLEM — I10 HTN (HYPERTENSION): Status: ACTIVE | Noted: 2023-12-02

## 2023-12-02 PROBLEM — R42 DIZZINESS: Status: RESOLVED | Noted: 2023-03-16 | Resolved: 2023-12-02

## 2023-12-02 PROBLEM — M79.605 BILATERAL LEG PAIN: Status: ACTIVE | Noted: 2023-12-02

## 2023-12-02 PROBLEM — M79.604 BILATERAL LEG PAIN: Status: ACTIVE | Noted: 2023-12-02

## 2023-12-02 LAB
ALBUMIN SERPL-MCNC: 4 G/DL (ref 3.4–5)
ALBUMIN/GLOB SERPL: 1.6 {RATIO} (ref 1.1–2.2)
ALP SERPL-CCNC: 67 U/L (ref 40–129)
ALT SERPL-CCNC: 12 U/L (ref 10–40)
ANION GAP SERPL CALCULATED.3IONS-SCNC: 7 MMOL/L (ref 3–16)
AST SERPL-CCNC: 15 U/L (ref 15–37)
BASOPHILS # BLD: 0.1 K/UL (ref 0–0.2)
BASOPHILS NFR BLD: 1.1 %
BILIRUB SERPL-MCNC: 0.4 MG/DL (ref 0–1)
BUN SERPL-MCNC: 18 MG/DL (ref 7–20)
CALCIUM SERPL-MCNC: 9.8 MG/DL (ref 8.3–10.6)
CHLORIDE SERPL-SCNC: 100 MMOL/L (ref 99–110)
CO2 SERPL-SCNC: 30 MMOL/L (ref 21–32)
CREAT SERPL-MCNC: 0.7 MG/DL (ref 0.8–1.3)
DEPRECATED RDW RBC AUTO: 14.3 % (ref 12.4–15.4)
EOSINOPHIL # BLD: 0.1 K/UL (ref 0–0.6)
EOSINOPHIL NFR BLD: 2.4 %
GFR SERPLBLD CREATININE-BSD FMLA CKD-EPI: >60 ML/MIN/{1.73_M2}
GLUCOSE BLD-MCNC: 215 MG/DL (ref 70–99)
GLUCOSE BLD-MCNC: 263 MG/DL (ref 70–99)
GLUCOSE SERPL-MCNC: 280 MG/DL (ref 70–99)
HCT VFR BLD AUTO: 36.9 % (ref 40.5–52.5)
HGB BLD-MCNC: 12.4 G/DL (ref 13.5–17.5)
INR PPP: 0.92 (ref 0.84–1.16)
LYMPHOCYTES # BLD: 1.4 K/UL (ref 1–5.1)
LYMPHOCYTES NFR BLD: 26.8 %
MCH RBC QN AUTO: 30.9 PG (ref 26–34)
MCHC RBC AUTO-ENTMCNC: 33.7 G/DL (ref 31–36)
MCV RBC AUTO: 91.9 FL (ref 80–100)
MONOCYTES # BLD: 0.5 K/UL (ref 0–1.3)
MONOCYTES NFR BLD: 9.4 %
NEUTROPHILS # BLD: 3.2 K/UL (ref 1.7–7.7)
NEUTROPHILS NFR BLD: 60.3 %
PERFORMED ON: ABNORMAL
PERFORMED ON: ABNORMAL
PLATELET # BLD AUTO: 195 K/UL (ref 135–450)
PMV BLD AUTO: 8.1 FL (ref 5–10.5)
POTASSIUM SERPL-SCNC: 3.8 MMOL/L (ref 3.5–5.1)
PROT SERPL-MCNC: 6.5 G/DL (ref 6.4–8.2)
PROTHROMBIN TIME: 12.4 SEC (ref 11.5–14.8)
RBC # BLD AUTO: 4.02 M/UL (ref 4.2–5.9)
SODIUM SERPL-SCNC: 137 MMOL/L (ref 136–145)
TROPONIN, HIGH SENSITIVITY: 22 NG/L (ref 0–22)
WBC # BLD AUTO: 5.3 K/UL (ref 4–11)

## 2023-12-02 PROCEDURE — 93005 ELECTROCARDIOGRAM TRACING: CPT | Performed by: PHYSICIAN ASSISTANT

## 2023-12-02 PROCEDURE — 84484 ASSAY OF TROPONIN QUANT: CPT

## 2023-12-02 PROCEDURE — 99285 EMERGENCY DEPT VISIT HI MDM: CPT

## 2023-12-02 PROCEDURE — 85610 PROTHROMBIN TIME: CPT

## 2023-12-02 PROCEDURE — 70498 CT ANGIOGRAPHY NECK: CPT

## 2023-12-02 PROCEDURE — 6360000004 HC RX CONTRAST MEDICATION: Performed by: PHYSICIAN ASSISTANT

## 2023-12-02 PROCEDURE — 70450 CT HEAD/BRAIN W/O DYE: CPT

## 2023-12-02 PROCEDURE — 2580000003 HC RX 258: Performed by: EMERGENCY MEDICINE

## 2023-12-02 PROCEDURE — 85025 COMPLETE CBC W/AUTO DIFF WBC: CPT

## 2023-12-02 PROCEDURE — 6370000000 HC RX 637 (ALT 250 FOR IP): Performed by: PHYSICIAN ASSISTANT

## 2023-12-02 PROCEDURE — 6370000000 HC RX 637 (ALT 250 FOR IP): Performed by: EMERGENCY MEDICINE

## 2023-12-02 PROCEDURE — 80053 COMPREHEN METABOLIC PANEL: CPT

## 2023-12-02 RX ORDER — ASPIRIN 81 MG/1
81 TABLET, CHEWABLE ORAL ONCE
Status: COMPLETED | OUTPATIENT
Start: 2023-12-02 | End: 2023-12-02

## 2023-12-02 RX ORDER — 0.9 % SODIUM CHLORIDE 0.9 %
500 INTRAVENOUS SOLUTION INTRAVENOUS ONCE
Status: COMPLETED | OUTPATIENT
Start: 2023-12-02 | End: 2023-12-03

## 2023-12-02 RX ORDER — MECLIZINE HCL 12.5 MG/1
25 TABLET ORAL ONCE
Status: COMPLETED | OUTPATIENT
Start: 2023-12-02 | End: 2023-12-02

## 2023-12-02 RX ADMIN — ASPIRIN 81 MG: 81 TABLET, CHEWABLE ORAL at 23:07

## 2023-12-02 RX ADMIN — IOPAMIDOL 75 ML: 755 INJECTION, SOLUTION INTRAVENOUS at 20:39

## 2023-12-02 RX ADMIN — MECLIZINE 25 MG: 12.5 TABLET ORAL at 20:51

## 2023-12-02 RX ADMIN — SODIUM CHLORIDE 500 ML: 9 INJECTION, SOLUTION INTRAVENOUS at 23:09

## 2023-12-03 PROBLEM — D64.9 ANEMIA: Status: ACTIVE | Noted: 2023-12-03

## 2023-12-03 LAB
CHOLEST SERPL-MCNC: 127 MG/DL (ref 0–199)
DEPRECATED RDW RBC AUTO: 14.4 % (ref 12.4–15.4)
FOLATE SERPL-MCNC: 14.66 NG/ML (ref 4.78–24.2)
GLUCOSE BLD-MCNC: 104 MG/DL (ref 70–99)
GLUCOSE BLD-MCNC: 188 MG/DL (ref 70–99)
GLUCOSE BLD-MCNC: 201 MG/DL (ref 70–99)
GLUCOSE BLD-MCNC: 252 MG/DL (ref 70–99)
GLUCOSE BLD-MCNC: 300 MG/DL (ref 70–99)
HCT VFR BLD AUTO: 36.8 % (ref 40.5–52.5)
HDLC SERPL-MCNC: 55 MG/DL (ref 40–60)
HGB BLD-MCNC: 12.4 G/DL (ref 13.5–17.5)
LDLC SERPL CALC-MCNC: 54 MG/DL
MCH RBC QN AUTO: 30.7 PG (ref 26–34)
MCHC RBC AUTO-ENTMCNC: 33.7 G/DL (ref 31–36)
MCV RBC AUTO: 91.3 FL (ref 80–100)
PERFORMED ON: ABNORMAL
PLATELET # BLD AUTO: 200 K/UL (ref 135–450)
PMV BLD AUTO: 8.4 FL (ref 5–10.5)
RBC # BLD AUTO: 4.03 M/UL (ref 4.2–5.9)
TRIGL SERPL-MCNC: 89 MG/DL (ref 0–150)
VIT B12 SERPL-MCNC: 1032 PG/ML (ref 211–911)
VLDLC SERPL CALC-MCNC: 18 MG/DL
WBC # BLD AUTO: 5 K/UL (ref 4–11)

## 2023-12-03 PROCEDURE — G0378 HOSPITAL OBSERVATION PER HR: HCPCS

## 2023-12-03 PROCEDURE — 82746 ASSAY OF FOLIC ACID SERUM: CPT

## 2023-12-03 PROCEDURE — 84207 ASSAY OF VITAMIN B-6: CPT

## 2023-12-03 PROCEDURE — 82607 VITAMIN B-12: CPT

## 2023-12-03 PROCEDURE — 80061 LIPID PANEL: CPT

## 2023-12-03 PROCEDURE — 83036 HEMOGLOBIN GLYCOSYLATED A1C: CPT

## 2023-12-03 PROCEDURE — 97162 PT EVAL MOD COMPLEX 30 MIN: CPT

## 2023-12-03 PROCEDURE — 94760 N-INVAS EAR/PLS OXIMETRY 1: CPT

## 2023-12-03 PROCEDURE — 6370000000 HC RX 637 (ALT 250 FOR IP): Performed by: HOSPITALIST

## 2023-12-03 PROCEDURE — 97116 GAIT TRAINING THERAPY: CPT

## 2023-12-03 PROCEDURE — 36415 COLL VENOUS BLD VENIPUNCTURE: CPT

## 2023-12-03 PROCEDURE — 85027 COMPLETE CBC AUTOMATED: CPT

## 2023-12-03 PROCEDURE — 6370000000 HC RX 637 (ALT 250 FOR IP): Performed by: STUDENT IN AN ORGANIZED HEALTH CARE EDUCATION/TRAINING PROGRAM

## 2023-12-03 PROCEDURE — 97530 THERAPEUTIC ACTIVITIES: CPT

## 2023-12-03 RX ORDER — DEXTROSE MONOHYDRATE 100 MG/ML
INJECTION, SOLUTION INTRAVENOUS CONTINUOUS PRN
Status: DISCONTINUED | OUTPATIENT
Start: 2023-12-03 | End: 2023-12-04 | Stop reason: HOSPADM

## 2023-12-03 RX ORDER — LANOLIN ALCOHOL/MO/W.PET/CERES
400 CREAM (GRAM) TOPICAL DAILY
COMMUNITY

## 2023-12-03 RX ORDER — INSULIN LISPRO 100 [IU]/ML
0-4 INJECTION, SOLUTION INTRAVENOUS; SUBCUTANEOUS NIGHTLY
Status: DISCONTINUED | OUTPATIENT
Start: 2023-12-03 | End: 2023-12-04 | Stop reason: HOSPADM

## 2023-12-03 RX ORDER — INSULIN LISPRO 100 [IU]/ML
0.08 INJECTION, SOLUTION INTRAVENOUS; SUBCUTANEOUS
Status: DISCONTINUED | OUTPATIENT
Start: 2023-12-03 | End: 2023-12-04 | Stop reason: HOSPADM

## 2023-12-03 RX ORDER — HYDRALAZINE HYDROCHLORIDE 20 MG/ML
5 INJECTION INTRAMUSCULAR; INTRAVENOUS EVERY 4 HOURS PRN
Status: DISCONTINUED | OUTPATIENT
Start: 2023-12-03 | End: 2023-12-04 | Stop reason: HOSPADM

## 2023-12-03 RX ORDER — LANOLIN ALCOHOL/MO/W.PET/CERES
1000 CREAM (GRAM) TOPICAL DAILY
COMMUNITY

## 2023-12-03 RX ORDER — ONDANSETRON 4 MG/1
4 TABLET, ORALLY DISINTEGRATING ORAL EVERY 8 HOURS PRN
Status: DISCONTINUED | OUTPATIENT
Start: 2023-12-03 | End: 2023-12-04 | Stop reason: HOSPADM

## 2023-12-03 RX ORDER — ASPIRIN 300 MG/1
300 SUPPOSITORY RECTAL DAILY
Status: DISCONTINUED | OUTPATIENT
Start: 2023-12-03 | End: 2023-12-03

## 2023-12-03 RX ORDER — GABAPENTIN 100 MG/1
100 CAPSULE ORAL 3 TIMES DAILY
Status: DISCONTINUED | OUTPATIENT
Start: 2023-12-03 | End: 2023-12-04 | Stop reason: HOSPADM

## 2023-12-03 RX ORDER — ONDANSETRON 2 MG/ML
4 INJECTION INTRAMUSCULAR; INTRAVENOUS EVERY 6 HOURS PRN
Status: DISCONTINUED | OUTPATIENT
Start: 2023-12-03 | End: 2023-12-04 | Stop reason: HOSPADM

## 2023-12-03 RX ORDER — LOSARTAN POTASSIUM 50 MG/1
50 TABLET ORAL DAILY
Status: DISCONTINUED | OUTPATIENT
Start: 2023-12-03 | End: 2023-12-04 | Stop reason: HOSPADM

## 2023-12-03 RX ORDER — LANOLIN ALCOHOL/MO/W.PET/CERES
1000 CREAM (GRAM) TOPICAL DAILY
Status: DISCONTINUED | OUTPATIENT
Start: 2023-12-03 | End: 2023-12-04 | Stop reason: HOSPADM

## 2023-12-03 RX ORDER — PERPHENAZINE 4 MG
1 TABLET ORAL DAILY
COMMUNITY

## 2023-12-03 RX ORDER — ASPIRIN 81 MG/1
81 TABLET, CHEWABLE ORAL DAILY
Status: DISCONTINUED | OUTPATIENT
Start: 2023-12-03 | End: 2023-12-03

## 2023-12-03 RX ORDER — INSULIN LISPRO 100 [IU]/ML
0-4 INJECTION, SOLUTION INTRAVENOUS; SUBCUTANEOUS
Status: DISCONTINUED | OUTPATIENT
Start: 2023-12-03 | End: 2023-12-04 | Stop reason: HOSPADM

## 2023-12-03 RX ORDER — GABAPENTIN 100 MG/1
100 CAPSULE ORAL NIGHTLY
Status: ON HOLD | COMMUNITY
End: 2023-12-04 | Stop reason: SDUPTHER

## 2023-12-03 RX ORDER — ASPIRIN 81 MG/1
81 TABLET ORAL DAILY
Status: DISCONTINUED | OUTPATIENT
Start: 2023-12-03 | End: 2023-12-04 | Stop reason: HOSPADM

## 2023-12-03 RX ORDER — POLYETHYLENE GLYCOL 3350 17 G/17G
17 POWDER, FOR SOLUTION ORAL DAILY PRN
Status: DISCONTINUED | OUTPATIENT
Start: 2023-12-03 | End: 2023-12-04 | Stop reason: HOSPADM

## 2023-12-03 RX ORDER — INSULIN GLARGINE 100 [IU]/ML
0.25 INJECTION, SOLUTION SUBCUTANEOUS NIGHTLY
Status: DISCONTINUED | OUTPATIENT
Start: 2023-12-03 | End: 2023-12-04 | Stop reason: HOSPADM

## 2023-12-03 RX ORDER — ATORVASTATIN CALCIUM 40 MG/1
40 TABLET, FILM COATED ORAL NIGHTLY
Status: DISCONTINUED | OUTPATIENT
Start: 2023-12-03 | End: 2023-12-04 | Stop reason: HOSPADM

## 2023-12-03 RX ADMIN — LOSARTAN POTASSIUM 50 MG: 50 TABLET, FILM COATED ORAL at 09:24

## 2023-12-03 RX ADMIN — INSULIN GLARGINE 16 UNITS: 100 INJECTION, SOLUTION SUBCUTANEOUS at 20:10

## 2023-12-03 RX ADMIN — ATORVASTATIN CALCIUM 40 MG: 40 TABLET, FILM COATED ORAL at 20:10

## 2023-12-03 RX ADMIN — ASPIRIN 81 MG: 81 TABLET, COATED ORAL at 09:24

## 2023-12-03 RX ADMIN — INSULIN LISPRO 5 UNITS: 100 INJECTION, SOLUTION INTRAVENOUS; SUBCUTANEOUS at 10:22

## 2023-12-03 RX ADMIN — CYANOCOBALAMIN TAB 1000 MCG 1000 MCG: 1000 TAB at 17:58

## 2023-12-03 RX ADMIN — GABAPENTIN 100 MG: 100 CAPSULE ORAL at 17:58

## 2023-12-03 RX ADMIN — INSULIN LISPRO 5 UNITS: 100 INJECTION, SOLUTION INTRAVENOUS; SUBCUTANEOUS at 14:10

## 2023-12-03 RX ADMIN — INSULIN LISPRO 2 UNITS: 100 INJECTION, SOLUTION INTRAVENOUS; SUBCUTANEOUS at 10:23

## 2023-12-03 NOTE — H&P
V2.0  History and Physical      Name:  Tori Busch /Age/Sex: 1945  (70 y.o. male)   MRN & CSN:  [id number] & [id number] Encounter Date/Time: 2023 11:35 PM EST   Location:   PCP: No primary care provider on file. Hospital Day: 1    Assessment and Plan:   Tori Busch is a 70 y.o. male with a pmh of hypertension and diabetes who presents with Leg weakness, bilateral    Hospital Problems             Last Modified POA    * (Principal) Leg weakness, bilateral 2023 Yes    Type 2 diabetes mellitus ([address]) 2023 Yes    HTN (hypertension) 2023 Yes    Bilateral leg pain 2023 Yes    Imbalance 2023 Yes       Plan:  Bilateral leg weakness/ bilateral leg pain/imbalance-we will get PT  and OT to evaluate patient and then further determination will be made. Type 2 diabetes mellitus with hyperglycemia and complication of neuropathy-blood glucose now within goal.  Home oral hypoglycemic adjusted. Basal, prandial and correction scale insulin ordered. Hypertension-blood pressure is not within goal.  Home blood pressure medications continued. Trend blood pressures.-Hydralazine ordered. Normocytic anemia-chronic and stable. Will check vitamin B-12 especially as patient had imbalance. Also will check folate level. Neuropathy-check vitamin B6 level. Advanced care planning was discussed with patient for a total of 60 minutes. Full code, DNR CC, DNR CCA, power of  and living will were discussed. Patient in conjunction with his family elected to be a full code.   Disposition:   Current Living situation Home  Expected Disposition: Rehabilitation  Estimated D/C: 1 day    Diet Diet NPO for now and then cardiac diabetic diet when he passes dysphagia screen   DVT Prophylaxis [] Lovenox, []  Heparin, [x] SCDs, [] Ambulation,  [] Eliquis, [] Xarelto, [] Coumadin   Code Status Full code   Surrogate Decision Maker/ POA Patient's wife Sharon Womack, [phone])

## 2023-12-03 NOTE — PLAN OF CARE
Problem: Discharge Planning  Goal: Discharge to home or other facility with appropriate resources  Outcome: Progressing     Problem: Safety - Adult  Goal: Free from fall injury  Outcome: Progressing     Problem: ABCDS Injury Assessment  Goal: Absence of physical injury  Outcome: Progressing     Problem: Neurosensory - Adult  Goal: Achieves stable or improved neurological status  Outcome: Progressing

## 2023-12-03 NOTE — ED NOTES
Report given to Ellen Manuel RN. She verbalized understanding of patient condition and has no further questions.      Figueroa Godfrey RN  12/03/23 1875

## 2023-12-03 NOTE — ED PROVIDER NOTES
retinopathy of both eyes, macular edema presence unspecified, unspecified retinopathy severity, unspecified whether long term insulin use ([address])    3. Intracranial atherosclerosis          DISPOSITION/PLAN     DISPOSITION        PATIENT REFERRED TO:  No follow-up provider specified.     DISCHARGE MEDICATIONS:  New Prescriptions    No medications on file       DISCONTINUED MEDICATIONS:  Discontinued Medications    No medications on file              (Please note that portions of this note were completed with a voice recognition program.  Efforts were made to edit the dictations but occasionally words are mis-transcribed.)    Laura Mcgrath PA-C (electronically signed)       Laura Mcgrath PA-C  12/02/23 [id number]

## 2023-12-04 ENCOUNTER — APPOINTMENT (OUTPATIENT)
Dept: MRI IMAGING | Age: 78
DRG: 066 | End: 2023-12-04
Payer: MEDICARE

## 2023-12-04 VITALS
RESPIRATION RATE: 19 BRPM | SYSTOLIC BLOOD PRESSURE: 171 MMHG | OXYGEN SATURATION: 95 % | WEIGHT: 145.5 LBS | DIASTOLIC BLOOD PRESSURE: 79 MMHG | BODY MASS INDEX: 21.55 KG/M2 | TEMPERATURE: 98.3 F | HEART RATE: 71 BPM | HEIGHT: 69 IN

## 2023-12-04 PROBLEM — I63.9 ACUTE ISCHEMIC STROKE (HCC): Status: ACTIVE | Noted: 2023-12-04

## 2023-12-04 LAB
BASOPHILS # BLD: 0.1 K/UL (ref 0–0.2)
BASOPHILS NFR BLD: 0.9 %
DEPRECATED RDW RBC AUTO: 14.9 % (ref 12.4–15.4)
EKG ATRIAL RATE: 70 BPM
EKG DIAGNOSIS: NORMAL
EKG P AXIS: 54 DEGREES
EKG P-R INTERVAL: 182 MS
EKG Q-T INTERVAL: 398 MS
EKG QRS DURATION: 108 MS
EKG QTC CALCULATION (BAZETT): 429 MS
EKG R AXIS: -51 DEGREES
EKG T AXIS: -24 DEGREES
EKG VENTRICULAR RATE: 70 BPM
EOSINOPHIL # BLD: 0.2 K/UL (ref 0–0.6)
EOSINOPHIL NFR BLD: 3.3 %
EST. AVERAGE GLUCOSE BLD GHB EST-MCNC: 277.6 MG/DL
GLUCOSE BLD-MCNC: 150 MG/DL (ref 70–99)
GLUCOSE BLD-MCNC: 330 MG/DL (ref 70–99)
GLUCOSE BLD-MCNC: 87 MG/DL (ref 70–99)
HBA1C MFR BLD: 11.3 %
HCT VFR BLD AUTO: 36.2 % (ref 40.5–52.5)
HGB BLD-MCNC: 12.6 G/DL (ref 13.5–17.5)
LYMPHOCYTES # BLD: 2.1 K/UL (ref 1–5.1)
LYMPHOCYTES NFR BLD: 34.6 %
MCH RBC QN AUTO: 31.8 PG (ref 26–34)
MCHC RBC AUTO-ENTMCNC: 34.8 G/DL (ref 31–36)
MCV RBC AUTO: 91.4 FL (ref 80–100)
MONOCYTES # BLD: 0.6 K/UL (ref 0–1.3)
MONOCYTES NFR BLD: 9.5 %
NEUTROPHILS # BLD: 3.2 K/UL (ref 1.7–7.7)
NEUTROPHILS NFR BLD: 51.7 %
PERFORMED ON: ABNORMAL
PERFORMED ON: ABNORMAL
PERFORMED ON: NORMAL
PLATELET # BLD AUTO: 344 K/UL (ref 135–450)
PMV BLD AUTO: 10.2 FL (ref 5–10.5)
RBC # BLD AUTO: 3.96 M/UL (ref 4.2–5.9)
REASON FOR REJECTION: NORMAL
REJECTED TEST: NORMAL
WBC # BLD AUTO: 6.1 K/UL (ref 4–11)

## 2023-12-04 PROCEDURE — 94760 N-INVAS EAR/PLS OXIMETRY 1: CPT

## 2023-12-04 PROCEDURE — 36415 COLL VENOUS BLD VENIPUNCTURE: CPT

## 2023-12-04 PROCEDURE — 1200000000 HC SEMI PRIVATE

## 2023-12-04 PROCEDURE — 97530 THERAPEUTIC ACTIVITIES: CPT

## 2023-12-04 PROCEDURE — 6370000000 HC RX 637 (ALT 250 FOR IP): Performed by: HOSPITALIST

## 2023-12-04 PROCEDURE — 97116 GAIT TRAINING THERAPY: CPT

## 2023-12-04 PROCEDURE — 6360000002 HC RX W HCPCS: Performed by: HOSPITALIST

## 2023-12-04 PROCEDURE — 6370000000 HC RX 637 (ALT 250 FOR IP): Performed by: STUDENT IN AN ORGANIZED HEALTH CARE EDUCATION/TRAINING PROGRAM

## 2023-12-04 PROCEDURE — C8929 TTE W OR WO FOL WCON,DOPPLER: HCPCS

## 2023-12-04 PROCEDURE — 97165 OT EVAL LOW COMPLEX 30 MIN: CPT

## 2023-12-04 PROCEDURE — 85025 COMPLETE CBC W/AUTO DIFF WBC: CPT

## 2023-12-04 PROCEDURE — 93010 ELECTROCARDIOGRAM REPORT: CPT | Performed by: INTERNAL MEDICINE

## 2023-12-04 PROCEDURE — 70551 MRI BRAIN STEM W/O DYE: CPT

## 2023-12-04 RX ORDER — GABAPENTIN 100 MG/1
100 CAPSULE ORAL 3 TIMES DAILY
Qty: 90 CAPSULE | Refills: 1 | Status: SHIPPED | OUTPATIENT
Start: 2023-12-04 | End: 2024-02-02

## 2023-12-04 RX ORDER — CLOPIDOGREL BISULFATE 75 MG/1
75 TABLET ORAL DAILY
Status: DISCONTINUED | OUTPATIENT
Start: 2023-12-04 | End: 2023-12-04 | Stop reason: HOSPADM

## 2023-12-04 RX ORDER — CLOPIDOGREL BISULFATE 75 MG/1
75 TABLET ORAL DAILY
Qty: 21 TABLET | Refills: 0 | Status: SHIPPED | OUTPATIENT
Start: 2023-12-05 | End: 2023-12-25

## 2023-12-04 RX ADMIN — HYDRALAZINE HYDROCHLORIDE 5 MG: 20 INJECTION, SOLUTION INTRAMUSCULAR; INTRAVENOUS at 18:15

## 2023-12-04 RX ADMIN — INSULIN LISPRO 3 UNITS: 100 INJECTION, SOLUTION INTRAVENOUS; SUBCUTANEOUS at 14:28

## 2023-12-04 RX ADMIN — INSULIN LISPRO 5 UNITS: 100 INJECTION, SOLUTION INTRAVENOUS; SUBCUTANEOUS at 14:28

## 2023-12-04 RX ADMIN — LOSARTAN POTASSIUM 50 MG: 50 TABLET, FILM COATED ORAL at 08:57

## 2023-12-04 RX ADMIN — GABAPENTIN 100 MG: 100 CAPSULE ORAL at 08:57

## 2023-12-04 RX ADMIN — ASPIRIN 81 MG: 81 TABLET, COATED ORAL at 08:57

## 2023-12-04 RX ADMIN — CLOPIDOGREL BISULFATE 75 MG: 75 TABLET ORAL at 15:50

## 2023-12-04 RX ADMIN — GABAPENTIN 100 MG: 100 CAPSULE ORAL at 18:11

## 2023-12-04 RX ADMIN — INSULIN LISPRO 5 UNITS: 100 INJECTION, SOLUTION INTRAVENOUS; SUBCUTANEOUS at 18:50

## 2023-12-04 RX ADMIN — HYDRALAZINE HYDROCHLORIDE 5 MG: 20 INJECTION, SOLUTION INTRAMUSCULAR; INTRAVENOUS at 12:04

## 2023-12-04 RX ADMIN — CYANOCOBALAMIN TAB 1000 MCG 1000 MCG: 1000 TAB at 08:57

## 2023-12-04 NOTE — PLAN OF CARE
Problem: Discharge Planning  Goal: Discharge to home or other facility with appropriate resources  Outcome: Adequate for Discharge  Flowsheets (Taken 12/4/2023 0848)  Discharge to home or other facility with appropriate resources: Identify barriers to discharge with patient and caregiver     Problem: Safety - Adult  Goal: Free from fall injury  Outcome: Adequate for Discharge     Problem: ABCDS Injury Assessment  Goal: Absence of physical injury  Outcome: Adequate for Discharge     Problem: Neurosensory - Adult  Goal: Achieves stable or improved neurological status  Outcome: Adequate for Discharge  Goal: Absence of seizures  Outcome: Adequate for Discharge  Goal: Remains free of injury related to seizures activity  Outcome: Adequate for Discharge  Goal: Achieves maximal functionality and self care  Outcome: Adequate for Discharge     Problem: Chronic Conditions and Co-morbidities  Goal: Patient's chronic conditions and co-morbidity symptoms are monitored and maintained or improved  Outcome: Adequate for Discharge  Flowsheets (Taken 12/4/2023 0848)  Care Plan - Patient's Chronic Conditions and Co-Morbidity Symptoms are Monitored and Maintained or Improved: Monitor and assess patient's chronic conditions and comorbid symptoms for stability, deterioration, or improvement

## 2023-12-04 NOTE — PLAN OF CARE
Problem: Discharge Planning  Goal: Discharge to home or other facility with appropriate resources  Outcome: Progressing     Problem: Safety - Adult  Goal: Free from fall injury  Outcome: Progressing     Problem: ABCDS Injury Assessment  Goal: Absence of physical injury  Outcome: Progressing     Problem: Neurosensory - Adult  Goal: Achieves stable or improved neurological status  Outcome: Progressing  Goal: Absence of seizures  Outcome: Progressing  Goal: Remains free of injury related to seizures activity  Outcome: Progressing  Goal: Achieves maximal functionality and self care  Outcome: Progressing     Problem: Chronic Conditions and Co-morbidities  Goal: Patient's chronic conditions and co-morbidity symptoms are monitored and maintained or improved  Outcome: Progressing

## 2023-12-04 NOTE — CONSULTS
Neurology Consult Note  Reason for Consult: likely neuropathy, MRI pending    Chief complaint: can't walk    Dr Gama Lozano MD asked me to see Samantha Carty in consultation for evaluation of likely neuropathy, MRI pending    History of Present Illness:  Samantha Carty is a 70 y.o. male who presents with trouble walking. I obtained my information via discussion w/ the patient and his wife at the bedside, supplemented by chart review. The patient's wife tells me that the patient has been experiencing gradual problems w/ balance and gait for the past 4-5 years. He ambulates w/ a cane. He has also been dealing w/ peripheral neuropathy for many years. In reviewing the chart, he was evaluated by orthopedics in May of this year for lower back and leg pain. At the time it was reported that he was having some trouble w/ urinary incontinence for a couple of years. L spine MRI at the time was unrevealing. His wife says that he was found to have hemorrhages in his eye. He has gotten two injections (one at the beginning of October, another at the end) and since the second injection she felt that his walking and balance had gotten acutely worse. On Saturday he was at a family function and at one point he seemed a bit pale. He wasn't really moving or talking. It took his daughter and a nephew to actually get him up and when they did he was sort of dragging his feet like someone would when they are drunk (he does not drink alcohol). He was subsequently transported to the ED in order to be evaluated. Initial BP was 161/77. CT head no acute hemorrhage. CTA head/neck w/ intracranial atherosclerotic disease. NIH was 0 per ED notes. Currently he says he feels OK. He has no specific neurologic complaints.       Medical History:  Past Medical History:   Diagnosis Date    Diabetes mellitus ([address])     Hypertension      Past Surgical History:   Procedure Laterality Date

## 2023-12-06 LAB — VIT B6 SERPL-MCNC: 30.6 NMOL/L (ref 20–125)

## 2025-07-10 NOTE — PROGRESS NOTES
Physical Therapy  Facility/Department: [address] PROGRESSIVE CARE  Physical Therapy Initial Assessment    Name: Dilcia Hendricks  : 1945  MRN: [id number]  Date of Service: 3/15/2023    Discharge Recommendations:  Home with assist PRN   PT Equipment Recommendations  Equipment Needed: Rupa Francisco scored a 20/24 on the AM-PAC short mobility form. At this time, no further PT is recommended upon discharge. Recommend patient returns to prior setting with prior services. Patient Diagnosis(es): The primary encounter diagnosis was Stroke-like symptoms. Diagnoses of Altered mental status, unspecified altered mental status type, Postural instability of trunk, and Dizziness were also pertinent to this visit. Past Medical History:  has a past medical history of Diabetes mellitus ([address] and Hypertension. Past Surgical History:  has a past surgical history that includes eye surgery.     Assessment   Body Structures, Functions, Activity Limitations Requiring Skilled Therapeutic Intervention: Decreased functional mobility   Assessment: pt is a 79 yo male who was adm to hosp with confusion, nausea and difficulty walking; pt appears close to his baseline now, MRI pending; will follow while in hosp but do not anticipate any further therapy needs once discharged  Therapy Prognosis: Good  Decision Making: Low Complexity  Barriers to Learning: language  Requires PT Follow-Up: Yes  Activity Tolerance  Activity Tolerance: Patient tolerated treatment well     Plan   Physcial Therapy Plan  General Plan: 2-3 times per week  Current Treatment Recommendations: Functional mobility training, Balance training, Gait training, Transfer training  Safety Devices  Type of Devices:  (pt taken to MRI by transport)     Restrictions  Restrictions/Precautions  Restrictions/Precautions: Fall Risk  Position Activity Restriction  Other position/activity restrictions: peripheral neuropathy     Subjective   General  Chart Subjective     Patient ID: Pam Ortega is a 80 y.o. female.    Chief Complaint: Transitional Care (Hospital Follow-up) and Health Maintenance (Shingles Vaccine(1 of 2) Never done- pt declined/RSV Vaccine (Age 60+ and Pregnant patients)(1 - 1-dose 75+ series) Never done- Pt declined/TETANUS VACCINE due on 03/06/2023- Pt declined/Diabetic Eye Exam due on 06/14/2025 [facility])    Pt presents for a hospital follow-up. Discharge meds reconciled with home meds. Pt has no complaints today.       Review of Systems   Constitutional:  Negative for activity change, appetite change, chills, fatigue and fever.   HENT:  Negative for nasal congestion, ear discharge, nosebleeds, postnasal drip, rhinorrhea, sinus pressure/congestion, sneezing, sore throat and tinnitus.    Eyes:  Negative for pain, discharge, redness and itching.   Respiratory:  Negative for cough, choking, chest tightness, shortness of breath and wheezing.    Cardiovascular:  Negative for chest pain.   Gastrointestinal:  Negative for abdominal distention, abdominal pain, blood in stool, change in bowel habit, constipation, diarrhea, nausea and vomiting.   Genitourinary:  Negative for decreased urine volume, dysuria, flank pain and frequency.   Musculoskeletal:  Negative for back pain and gait problem.   Integumentary:  Negative for wound, breast mass and breast discharge.   Allergic/Immunologic: Negative for immunocompromised state.   Neurological:  Negative for dizziness, light-headedness and headaches.   Psychiatric/Behavioral:  Negative for agitation, behavioral problems and hallucinations.    Breast: Negative for mass         Objective     Physical Exam  Vitals and nursing note reviewed.   Constitutional:       Appearance: Normal appearance.   Cardiovascular:      Rate and Rhythm: Normal rate and regular rhythm.      Heart sounds: Normal heart sounds.   Pulmonary:      Effort: Pulmonary effort is normal.      Breath sounds: Normal breath  Reviewed: Yes  Additional Pertinent Hx: per ER note: Robin Zhang is a 78 y.o. male who presents reportedly was last known normal at 9 AM today. Patient reportedly speaks Niuean primarily. He does indicate that he would like his wife to translate for us. According to patient's wife he had gotten up at 8 AM and eaten breakfast.  He was thinking well. He was ambulating well. He went back to sleep at 9 AM which is uncommon for him. He did not wake up until 2:00. At that time and since he has had confusion, dizziness, unable to stand or walk on his own with truncal instability. He has been confused and had a hard time being oriented. He did vomit once today but has tolerated a little liquids afterwards. No fever at home. Did just fly in from [place] yesterday where he typically lives. \"  Response To Previous Treatment: Not applicable  Family / Caregiver Present: Yes (wife in room)  Referring Practitioner: Dr Yadira Seymour  Referral Date : 03/15/23  Follows Commands: Within Functional Limits  General Comment  Comments: pt speaks Niuean and understands some English but wife speaks English  Subjective  Subjective: pt very pleasant and agreeable to working with therapy         Social/Functional History  Social/Functional History  Lives With: Spouse  Type of Home: House  Home Layout: One level  Home Access: Level entry  Bathroom Shower/Tub: Walk-in shower  Bathroom Toilet: Handicap height  Bathroom Equipment: Grab bars in shower, Shower chair  Bathroom Accessibility: Walker accessible  Home Equipment: Delphine Landsman, rolling, Cane  Has the patient had two or more falls in the past year or any fall with injury in the past year?: Yes (last year he fell, has neuropathy)  Receives Help From: Family  ADL Assistance: Independent  Homemaking Assistance: Needs assistance  Ambulation Assistance: Needs assistance (RW primarily, not all the time)  Transfer Assistance: Independent  Active : Yes (has trouble with vision sounds.   Musculoskeletal:      Comments: Pt is in a wheelchair    Neurological:      Mental Status: She is alert and oriented to person, place, and time.   Psychiatric:         Mood and Affect: Mood normal.         Behavior: Behavior normal.            Assessment and Plan     1. Type 2 diabetes mellitus with other specified complication, without long-term current use of insulin  -     Ambulatory referral/consult to Diabetic Advanced Practice Providers (Medical Management); Future; Expected date: 07/17/2025  [id number] [id number] with Magalie Mendiola NP    2. Essential hypertension  Low sodium diet  Controlled at 128/72  Arm sling in place after new defibrillator placed    3. Chronic kidney disease (CKD), stage V  Avoid nsaids    4. Hyperlipidemia, unspecified hyperlipidemia type  Low cholesterol diet  Avoid fried foods    5. Congestive heart failure, unspecified HF chronicity, unspecified heart failure type  Follow-up with cardiology     6. History of CVA (cerebrovascular accident)        Labs at next visit in 3 months.          Follow up in about 3 months (around 10/10/2025).       and neuropathy in feet)  Patient's  Info: only short distance  Mode of Transportation: Car  Leisure & Hobbies: used to read but not able to currently with decreased vision  Additional Comments: pt and wife live in [facility] and Home: Impaired  Vision Exceptions: Wears glasses for reading (has had 3 operations and can't see too far)  Hearing  Hearing: Within functional limits (for eval)    Cognition   Orientation  Overall Orientation Status: Within Functional Limits  Cognition  Overall Cognitive Status: WFL     Objective   Heart Rate: 88  Heart Rate Source: Monitor  BP: (!) 153/74  BP Location: Right upper arm  BP Method: Automatic  Patient Position: Supine  MAP (Calculated): 100  Resp: 19  SpO2: 96 %  O2 Device: None (Room air)              AROM RLE (degrees)  RLE AROM: WFL  AROM LLE (degrees)  LLE AROM : WFL  Strength RLE  Strength RLE: WFL  Strength LLE  Strength LLE: WFL           Bed mobility  Supine to Sit: Modified independent  Sit to Supine: Modified independent  Transfers  Sit to Stand: Stand by assistance  Stand to Sit: Stand by assistance  Ambulation  Surface: Level tile  Device: Rolling Walker  Assistance: Stand by assistance  Quality of Gait: slow steady pace; no LOB  Distance: 100'  Comments: transporter arrived to take pt for MRI  More Ambulation?: Yes  Ambulation 2  Surface - 2: level tile  Device 2: No device  Assistance 2: Contact guard assistance  Quality of Gait 2: slow small steps; slightly unsteady but no overt LOB  Distance: 20'     Balance  Comments: MI for sitting balance; CGA/SBA for standing balance; poor vision and neuropathy contribute to balance deficits           OutComes Score                                                  AM-PAC Score  AM-PAC Inpatient Mobility Raw Score : 20 (03/15/23 0958)  AM-PAC Inpatient T-Scale Score : 47.67 (03/15/23 0958)  Mobility Inpatient CMS 0-100% Score: 35.83 (03/15/23 1853)  Mobility Inpatient CMS G-Code Modifier : Carina Hernandez (03/15/23 0556)          Tinneti Score       Goals  Short Term Goals  Time Frame for Short Term Goals: by discharge  Short Term Goal 1: transfers MI  Short Term Goal 2: amb 100-200' with RW sup  Patient Goals   Patient Goals : pt did not have a goal       Education  Patient Education  Education Given To: Patient  Education Provided: Role of Therapy  Education Method: Verbal  Education Outcome: Verbalized understanding (wife assisted with interpretation)      Therapy Time   Individual Concurrent Group Co-treatment   Time In 0908         Time Out 1982         Minutes 46                 MYRNA CM PT   Electronically signed by MYRNA CM PT on 3/15/2023 at 9:59 AM